# Patient Record
Sex: FEMALE | Race: WHITE | NOT HISPANIC OR LATINO | Employment: OTHER | URBAN - METROPOLITAN AREA
[De-identification: names, ages, dates, MRNs, and addresses within clinical notes are randomized per-mention and may not be internally consistent; named-entity substitution may affect disease eponyms.]

---

## 2018-10-02 ENCOUNTER — HOSPITAL ENCOUNTER (OUTPATIENT)
Dept: GENERAL RADIOLOGY | Facility: HOSPITAL | Age: 78
Discharge: HOME | End: 2018-10-02
Admitting: ORTHOPAEDIC SURGERY

## 2018-10-02 ENCOUNTER — HOSPITAL ENCOUNTER (OUTPATIENT)
Dept: GENERAL RADIOLOGY | Facility: HOSPITAL | Age: 78
Discharge: HOME | End: 2018-10-02

## 2018-10-02 ENCOUNTER — APPOINTMENT (OUTPATIENT)
Dept: PREADMISSION TESTING | Facility: HOSPITAL | Age: 78
End: 2018-10-02

## 2018-10-02 VITALS
HEART RATE: 51 BPM | DIASTOLIC BLOOD PRESSURE: 65 MMHG | RESPIRATION RATE: 16 BRPM | OXYGEN SATURATION: 99 % | SYSTOLIC BLOOD PRESSURE: 157 MMHG | WEIGHT: 173 LBS | TEMPERATURE: 97.4 F | HEIGHT: 65 IN | BODY MASS INDEX: 28.82 KG/M2

## 2018-10-02 DIAGNOSIS — M17.12 PRIMARY OSTEOARTHRITIS OF LEFT KNEE: ICD-10-CM

## 2018-10-02 LAB
ALBUMIN SERPL-MCNC: 4.4 G/DL (ref 3.5–5.2)
ALBUMIN/GLOB SERPL: 1.5 G/DL
ALP SERPL-CCNC: 69 U/L (ref 39–117)
ALT SERPL W P-5'-P-CCNC: 30 U/L (ref 1–33)
ANION GAP SERPL CALCULATED.3IONS-SCNC: 10 MMOL/L
APTT PPP: 26.1 SECONDS (ref 22.7–35.4)
AST SERPL-CCNC: 26 U/L (ref 1–32)
BACTERIA UR QL AUTO: ABNORMAL /HPF
BASOPHILS # BLD AUTO: 0.01 10*3/MM3 (ref 0–0.2)
BASOPHILS NFR BLD AUTO: 0.2 % (ref 0–1.5)
BILIRUB SERPL-MCNC: 0.3 MG/DL (ref 0.1–1.2)
BILIRUB UR QL STRIP: NEGATIVE
BUN BLD-MCNC: 23 MG/DL (ref 8–23)
BUN/CREAT SERPL: 27.1 (ref 7–25)
CALCIUM SPEC-SCNC: 10.3 MG/DL (ref 8.6–10.5)
CHLORIDE SERPL-SCNC: 104 MMOL/L (ref 98–107)
CLARITY UR: CLEAR
CO2 SERPL-SCNC: 26 MMOL/L (ref 22–29)
COLOR UR: ABNORMAL
CREAT BLD-MCNC: 0.85 MG/DL (ref 0.57–1)
DEPRECATED RDW RBC AUTO: 46.1 FL (ref 37–54)
EOSINOPHIL # BLD AUTO: 0.14 10*3/MM3 (ref 0–0.7)
EOSINOPHIL NFR BLD AUTO: 2.3 % (ref 0.3–6.2)
ERYTHROCYTE [DISTWIDTH] IN BLOOD BY AUTOMATED COUNT: 13 % (ref 11.7–13)
GFR SERPL CREATININE-BSD FRML MDRD: 65 ML/MIN/1.73
GLOBULIN UR ELPH-MCNC: 3 GM/DL
GLUCOSE BLD-MCNC: 95 MG/DL (ref 65–99)
GLUCOSE UR STRIP-MCNC: NEGATIVE MG/DL
HCT VFR BLD AUTO: 41.3 % (ref 35.6–45.5)
HGB BLD-MCNC: 13.4 G/DL (ref 11.9–15.5)
HGB UR QL STRIP.AUTO: NEGATIVE
HYALINE CASTS UR QL AUTO: ABNORMAL /LPF
IMM GRANULOCYTES # BLD: 0.01 10*3/MM3 (ref 0–0.03)
IMM GRANULOCYTES NFR BLD: 0.2 % (ref 0–0.5)
INR PPP: 1.03 (ref 0.9–1.1)
KETONES UR QL STRIP: NEGATIVE
LEUKOCYTE ESTERASE UR QL STRIP.AUTO: ABNORMAL
LYMPHOCYTES # BLD AUTO: 1.44 10*3/MM3 (ref 0.9–4.8)
LYMPHOCYTES NFR BLD AUTO: 23.7 % (ref 19.6–45.3)
MCH RBC QN AUTO: 31.7 PG (ref 26.9–32)
MCHC RBC AUTO-ENTMCNC: 32.4 G/DL (ref 32.4–36.3)
MCV RBC AUTO: 97.6 FL (ref 80.5–98.2)
MONOCYTES # BLD AUTO: 0.85 10*3/MM3 (ref 0.2–1.2)
MONOCYTES NFR BLD AUTO: 14 % (ref 5–12)
MUCOUS THREADS URNS QL MICRO: ABNORMAL /HPF
NEUTROPHILS # BLD AUTO: 3.64 10*3/MM3 (ref 1.9–8.1)
NEUTROPHILS NFR BLD AUTO: 59.8 % (ref 42.7–76)
NITRITE UR QL STRIP: NEGATIVE
PH UR STRIP.AUTO: <=5 [PH] (ref 5–8)
PLATELET # BLD AUTO: 184 10*3/MM3 (ref 140–500)
PMV BLD AUTO: 10 FL (ref 6–12)
POTASSIUM BLD-SCNC: 4.2 MMOL/L (ref 3.5–5.2)
PROT SERPL-MCNC: 7.4 G/DL (ref 6–8.5)
PROT UR QL STRIP: NEGATIVE
PROTHROMBIN TIME: 13.3 SECONDS (ref 11.7–14.2)
RBC # BLD AUTO: 4.23 10*6/MM3 (ref 3.9–5.2)
RBC # UR: ABNORMAL /HPF
REF LAB TEST METHOD: ABNORMAL
SODIUM BLD-SCNC: 140 MMOL/L (ref 136–145)
SP GR UR STRIP: 1.02 (ref 1–1.03)
SQUAMOUS #/AREA URNS HPF: ABNORMAL /HPF
UROBILINOGEN UR QL STRIP: ABNORMAL
WBC NRBC COR # BLD: 6.08 10*3/MM3 (ref 4.5–10.7)
WBC UR QL AUTO: ABNORMAL /HPF

## 2018-10-02 PROCEDURE — 36415 COLL VENOUS BLD VENIPUNCTURE: CPT

## 2018-10-02 PROCEDURE — 80053 COMPREHEN METABOLIC PANEL: CPT | Performed by: ORTHOPAEDIC SURGERY

## 2018-10-02 PROCEDURE — 71046 X-RAY EXAM CHEST 2 VIEWS: CPT

## 2018-10-02 PROCEDURE — 85025 COMPLETE CBC W/AUTO DIFF WBC: CPT | Performed by: ORTHOPAEDIC SURGERY

## 2018-10-02 PROCEDURE — 81001 URINALYSIS AUTO W/SCOPE: CPT | Performed by: ORTHOPAEDIC SURGERY

## 2018-10-02 PROCEDURE — 73560 X-RAY EXAM OF KNEE 1 OR 2: CPT

## 2018-10-02 PROCEDURE — 85610 PROTHROMBIN TIME: CPT | Performed by: ORTHOPAEDIC SURGERY

## 2018-10-02 PROCEDURE — 85730 THROMBOPLASTIN TIME PARTIAL: CPT | Performed by: ORTHOPAEDIC SURGERY

## 2018-10-02 PROCEDURE — 93010 ELECTROCARDIOGRAM REPORT: CPT | Performed by: INTERNAL MEDICINE

## 2018-10-02 PROCEDURE — 93005 ELECTROCARDIOGRAM TRACING: CPT

## 2018-10-02 RX ORDER — LISINOPRIL 10 MG/1
10 TABLET ORAL EVERY MORNING
COMMUNITY

## 2018-10-02 ASSESSMENT — KOOS JR
KOOS JR SCORE: 65.994
KOOS JR SCORE: 8

## 2018-10-02 NOTE — DISCHARGE INSTRUCTIONS
Take the following medications the morning of surgery with a small sip of water:  BRING LISINOPRIL  RANITIDINE TAKE THAT MORNING      General Instructions:  • Do not eat solid food after midnight the night before surgery.  • You may drink clear liquids day of surgery but must stop at least one hour before your hospital arrival time.  • It is beneficial for you to have a clear drink that contains carbohydrates the day of surgery.  We suggest a 12 to 20 ounce bottle of Gatorade or Powerade for non-diabetic patients or a 12 to 20 ounce bottle of G2 or Powerade Zero for diabetic patients. (Pediatric patients, are not advised to drink a 12 to 20 ounce carbohydrate drink)    Clear liquids are liquids you can see through.  Nothing red in color.     Plain water                               Sports drinks  Sodas                                   Gelatin (Jell-O)  Fruit juices without pulp such as white grape juice and apple juice  Popsicles that contain no fruit or yogurt  Tea or coffee (no cream or milk added)  Gatorade / Powerade  G2 / Powerade Zero    • Infants may have breast milk up to four hours before surgery.  • Infants drinking formula may drink formula up to six hours before surgery.   • Patients who avoid smoking, chewing tobacco and alcohol for 4 weeks prior to surgery have a reduced risk of post-operative complications.  Quit smoking as many days before surgery as you can.  • Do not smoke, use chewing tobacco or drink alcohol the day of surgery.   • If applicable bring your C-PAP/ BI-PAP machine.  • Bring any papers given to you in the doctor’s office.  • Wear clean comfortable clothes and socks.  • Do not wear contact lenses or make-up.  Bring a case for your glasses.   • Bring crutches or walker if applicable.  • Remove all piercings.  Leave jewelry and any other valuables at home.  • Hair extensions with metal clips must be removed prior to surgery.  • The Pre-Admission Testing nurse will instruct you to  bring medications if unable to obtain an accurate list in Pre-Admission Testing.        If you were given a blood bank ID arm band remember to bring it with you the day of surgery.    Preventing a Surgical Site Infection:  • For 2 to 3 days before surgery, avoid shaving with a razor because the razor can irritate skin and make it easier to develop an infection.    • Any areas of open skin can increase the risk of a post-operative wound infection by allowing bacteria to enter and travel throughout the body.  Notify your surgeon if you have any skin wounds / rashes even if it is not near the expected surgical site.  The area will need assessed to determine if surgery should be delayed until it is healed.  • The night prior to surgery sleep in a clean bed with clean clothing.  Do not allow pets to sleep with you.  • Shower on the morning of surgery using a fresh bar of anti-bacterial soap (such as Dial) and clean washcloth.  Dry with a clean towel and dress in clean clothing.  • Ask your surgeon if you will be receiving antibiotics prior to surgery.  • Make sure you, your family, and all healthcare providers clean their hands with soap and water or an alcohol based hand  before caring for you or your wound.    Day of surgery: 10/8/2018 ARRIVAL TIME 5:30 AM  Upon arrival, a Pre-op nurse and Anesthesiologist will review your health history, obtain vital signs, and answer questions you may have.  The only belongings needed at this time will be your home medications and if applicable your C-PAP/BI-PAP machine.  If you are staying overnight your family can leave the rest of your belongings in the car and bring them to your room later.  A Pre-op nurse will start an IV and you may receive medication in preparation for surgery, including something to help you relax.  Your family will be able to see you in the Pre-op area.  While you are in surgery your family should notify the waiting room  if they leave  the waiting room area and provide a contact phone number.    Please be aware that surgery does come with discomfort.  We want to make every effort to control your discomfort so please discuss any uncontrolled symptoms with your nurse.   Your doctor will most likely have prescribed pain medications.      If you are going home after surgery you will receive individualized written care instructions before being discharged.  A responsible adult must drive you to and from the hospital on the day of your surgery and stay with you for 24 hours.    If you are staying overnight following surgery, you will be transported to your hospital room following the recovery period.  [facility] has all private rooms.    You have received a list of surgical assistants for your reference.  If you have any questions please call Pre-Admission Testing at [phone].  Deductibles and co-payments are collected on the day of service. Please be prepared to pay the required co-pay, deductible or deposit on the day of service as defined by your plan.    2% CHLORAHEXIDINE GLUCONATE* CLOTH  Preparing or “prepping” skin before surgery can reduce the risk of infection at the surgical site. To make the process easier, [facility] has chosen disposable cloths moistened with a rinse-free, 2% Chlorhexidine Gluconate (CHG) antiseptic solution. The steps below outline the prepping process and should be carefully followed.        Use the prep cloth on the area that is circled in the diagram             Directions Night before Surgery  1) Shower using a fresh bar of anti-bacterial soap (such as Dial) and clean washcloth.  Use a clean towel to completely dry your skin.  2) Do not use any lotions, oils or creams on your skin.  3) Open the package and remove 1 cloth, wipe your skin for 30 seconds in a circular motion.  Allow to dry for 3 minutes.  4) Repeat #3 with second cloth.  5) Do not touch your eyes, ears, or mouth with the prep  cloth.  6) Allow the wet prep solution to air dry.  7) Discard the prep cloth and wash your hands with soap and water.   8) Dress in clean bed clothes and sleep on fresh clean bed sheets.   9) You may experience some temporary itching after the prep.    Directions Day of Surgery  1) Repeat steps 1,2,3,4,5,6,7, and 9.   2) Dress in clean clothes before coming to the hospital.    BACTROBAN NASAL OINTMENT  There are many germs normally in your nose. Bactroban is an ointment that will help reduce these germs. Please follow these instructions for Bactroban use:      ____The day before surgery in the morning  Date________    ____The day before surgery in the evening              Date________    ____The day of surgery in the morning    Date________    **Squirt ½ package of Bactroban Ointment onto a cotton applicator and apply to inside of 1st nostril.  Squirt the remaining Bactroban and apply to the inside of the other nostril.

## 2018-10-08 ENCOUNTER — ANESTHESIA EVENT (OUTPATIENT)
Dept: PERIOP | Facility: HOSPITAL | Age: 78
End: 2018-10-08

## 2018-10-08 ENCOUNTER — ANESTHESIA (OUTPATIENT)
Dept: PERIOP | Facility: HOSPITAL | Age: 78
End: 2018-10-08

## 2018-10-08 ENCOUNTER — HOSPITAL ENCOUNTER (INPATIENT)
Facility: HOSPITAL | Age: 78
LOS: 3 days | Discharge: HOME HEALTH | End: 2018-10-11
Attending: ORTHOPAEDIC SURGERY | Admitting: ORTHOPAEDIC SURGERY

## 2018-10-08 ENCOUNTER — APPOINTMENT (OUTPATIENT)
Dept: GENERAL RADIOLOGY | Facility: HOSPITAL | Age: 78
End: 2018-10-08

## 2018-10-08 DIAGNOSIS — M17.12 PRIMARY OSTEOARTHRITIS OF LEFT KNEE: Primary | ICD-10-CM

## 2018-10-08 PROBLEM — M17.9 DJD (DEGENERATIVE JOINT DISEASE) OF KNEE: Status: ACTIVE | Noted: 2018-10-08

## 2018-10-08 PROCEDURE — 25010000002 HYDROMORPHONE 1 MG/ML SOLUTION: Performed by: ORTHOPAEDIC SURGERY

## 2018-10-08 PROCEDURE — C1713 ANCHOR/SCREW BN/BN,TIS/BN: HCPCS | Performed by: ORTHOPAEDIC SURGERY

## 2018-10-08 PROCEDURE — C1776 JOINT DEVICE (IMPLANTABLE): HCPCS | Performed by: ORTHOPAEDIC SURGERY

## 2018-10-08 PROCEDURE — 25010000002 SUCCINYLCHOLINE PER 20 MG: Performed by: NURSE ANESTHETIST, CERTIFIED REGISTERED

## 2018-10-08 PROCEDURE — 25010000003 CEFAZOLIN IN DEXTROSE 2-4 GM/100ML-% SOLUTION: Performed by: NURSE ANESTHETIST, CERTIFIED REGISTERED

## 2018-10-08 PROCEDURE — 25010000002 ONDANSETRON PER 1 MG: Performed by: NURSE ANESTHETIST, CERTIFIED REGISTERED

## 2018-10-08 PROCEDURE — 25010000002 FENTANYL CITRATE (PF) 100 MCG/2ML SOLUTION: Performed by: NURSE ANESTHETIST, CERTIFIED REGISTERED

## 2018-10-08 PROCEDURE — 25010000002 PROMETHAZINE PER 50 MG: Performed by: ORTHOPAEDIC SURGERY

## 2018-10-08 PROCEDURE — 25010000002 KETOROLAC TROMETHAMINE PER 15 MG: Performed by: ORTHOPAEDIC SURGERY

## 2018-10-08 PROCEDURE — 25010000002 VANCOMYCIN 10 G RECONSTITUTED SOLUTION: Performed by: ORTHOPAEDIC SURGERY

## 2018-10-08 PROCEDURE — 97110 THERAPEUTIC EXERCISES: CPT | Performed by: PHYSICAL THERAPIST

## 2018-10-08 PROCEDURE — 25010000003 CEFAZOLIN IN DEXTROSE 2-4 GM/100ML-% SOLUTION: Performed by: ORTHOPAEDIC SURGERY

## 2018-10-08 PROCEDURE — 25010000002 PROPOFOL 10 MG/ML EMULSION: Performed by: NURSE ANESTHETIST, CERTIFIED REGISTERED

## 2018-10-08 PROCEDURE — 25010000002 MIDAZOLAM PER 1 MG: Performed by: ANESTHESIOLOGY

## 2018-10-08 PROCEDURE — 73560 X-RAY EXAM OF KNEE 1 OR 2: CPT

## 2018-10-08 PROCEDURE — 0SRD069 REPLACEMENT OF LEFT KNEE JOINT WITH OXIDIZED ZIRCONIUM ON POLYETHYLENE SYNTHETIC SUBSTITUTE, CEMENTED, OPEN APPROACH: ICD-10-PCS | Performed by: ORTHOPAEDIC SURGERY

## 2018-10-08 PROCEDURE — 25010000002 ONDANSETRON PER 1 MG: Performed by: ORTHOPAEDIC SURGERY

## 2018-10-08 PROCEDURE — 97162 PT EVAL MOD COMPLEX 30 MIN: CPT | Performed by: PHYSICAL THERAPIST

## 2018-10-08 DEVICE — JOURNEY TIBIAL BASEPLATE NONPOROUS                                    LEFT SIZE 5
Type: IMPLANTABLE DEVICE | Site: KNEE | Status: FUNCTIONAL
Brand: JOURNEY

## 2018-10-08 DEVICE — IMPLANTABLE DEVICE: Type: IMPLANTABLE DEVICE | Site: KNEE | Status: FUNCTIONAL

## 2018-10-08 DEVICE — JOURNEY 7.5 ROUND RESURF PAT 35MM STANDARD
Type: IMPLANTABLE DEVICE | Site: KNEE | Status: FUNCTIONAL
Brand: JOURNEY

## 2018-10-08 DEVICE — CMT BONE PALACOS R HI/VISC 1X40: Type: IMPLANTABLE DEVICE | Site: KNEE | Status: FUNCTIONAL

## 2018-10-08 DEVICE — JOURNEY II CR FEMORAL OXINIUM NONPOROUS LEFT SIZE 6
Type: IMPLANTABLE DEVICE | Site: KNEE | Status: FUNCTIONAL
Brand: JOURNEY

## 2018-10-08 DEVICE — JOURNEY II CR INSERT XLPE LEFT                                    SIZE 5-6 10MM
Type: IMPLANTABLE DEVICE | Site: KNEE | Status: FUNCTIONAL
Brand: JOURNEY

## 2018-10-08 RX ORDER — ROCURONIUM BROMIDE 10 MG/ML
INJECTION, SOLUTION INTRAVENOUS AS NEEDED
Status: DISCONTINUED | OUTPATIENT
Start: 2018-10-08 | End: 2018-10-08 | Stop reason: SURG

## 2018-10-08 RX ORDER — SODIUM CHLORIDE, SODIUM LACTATE, POTASSIUM CHLORIDE, CALCIUM CHLORIDE 600; 310; 30; 20 MG/100ML; MG/100ML; MG/100ML; MG/100ML
9 INJECTION, SOLUTION INTRAVENOUS CONTINUOUS
Status: DISCONTINUED | OUTPATIENT
Start: 2018-10-08 | End: 2018-10-11 | Stop reason: HOSPADM

## 2018-10-08 RX ORDER — SODIUM CHLORIDE 9 MG/ML
INJECTION, SOLUTION INTRAVENOUS AS NEEDED
Status: DISCONTINUED | OUTPATIENT
Start: 2018-10-08 | End: 2018-10-08 | Stop reason: HOSPADM

## 2018-10-08 RX ORDER — ASPIRIN 325 MG
325 TABLET, DELAYED RELEASE (ENTERIC COATED) ORAL DAILY
Status: DISCONTINUED | OUTPATIENT
Start: 2018-10-08 | End: 2018-10-11

## 2018-10-08 RX ORDER — PROMETHAZINE HYDROCHLORIDE 25 MG/1
12.5 TABLET ORAL ONCE AS NEEDED
Status: DISCONTINUED | OUTPATIENT
Start: 2018-10-08 | End: 2018-10-08 | Stop reason: HOSPADM

## 2018-10-08 RX ORDER — HYDROCODONE BITARTRATE AND ACETAMINOPHEN 7.5; 325 MG/1; MG/1
1 TABLET ORAL ONCE AS NEEDED
Status: DISCONTINUED | OUTPATIENT
Start: 2018-10-08 | End: 2018-10-08 | Stop reason: HOSPADM

## 2018-10-08 RX ORDER — LIDOCAINE HYDROCHLORIDE 40 MG/ML
SOLUTION TOPICAL AS NEEDED
Status: DISCONTINUED | OUTPATIENT
Start: 2018-10-08 | End: 2018-10-08 | Stop reason: SURG

## 2018-10-08 RX ORDER — OXYCODONE HYDROCHLORIDE AND ACETAMINOPHEN 5; 325 MG/1; MG/1
1 TABLET ORAL EVERY 4 HOURS PRN
Status: DISCONTINUED | OUTPATIENT
Start: 2018-10-08 | End: 2018-10-11 | Stop reason: HOSPADM

## 2018-10-08 RX ORDER — ONDANSETRON 2 MG/ML
INJECTION INTRAMUSCULAR; INTRAVENOUS AS NEEDED
Status: DISCONTINUED | OUTPATIENT
Start: 2018-10-08 | End: 2018-10-08 | Stop reason: SURG

## 2018-10-08 RX ORDER — PROMETHAZINE HYDROCHLORIDE 25 MG/ML
5 INJECTION, SOLUTION INTRAMUSCULAR; INTRAVENOUS
Status: DISCONTINUED | OUTPATIENT
Start: 2018-10-08 | End: 2018-10-08 | Stop reason: HOSPADM

## 2018-10-08 RX ORDER — SODIUM CHLORIDE, SODIUM LACTATE, POTASSIUM CHLORIDE, CALCIUM CHLORIDE 600; 310; 30; 20 MG/100ML; MG/100ML; MG/100ML; MG/100ML
100 INJECTION, SOLUTION INTRAVENOUS CONTINUOUS
Status: DISCONTINUED | OUTPATIENT
Start: 2018-10-08 | End: 2018-10-11 | Stop reason: HOSPADM

## 2018-10-08 RX ORDER — ONDANSETRON 2 MG/ML
4 INJECTION INTRAMUSCULAR; INTRAVENOUS ONCE AS NEEDED
Status: DISCONTINUED | OUTPATIENT
Start: 2018-10-08 | End: 2018-10-08 | Stop reason: HOSPADM

## 2018-10-08 RX ORDER — ALBUTEROL SULFATE 2.5 MG/3ML
2.5 SOLUTION RESPIRATORY (INHALATION) ONCE AS NEEDED
Status: DISCONTINUED | OUTPATIENT
Start: 2018-10-08 | End: 2018-10-08 | Stop reason: HOSPADM

## 2018-10-08 RX ORDER — NALOXONE HCL 0.4 MG/ML
0.1 VIAL (ML) INJECTION
Status: DISCONTINUED | OUTPATIENT
Start: 2018-10-08 | End: 2018-10-11 | Stop reason: HOSPADM

## 2018-10-08 RX ORDER — KETOROLAC TROMETHAMINE 15 MG/ML
15 INJECTION, SOLUTION INTRAMUSCULAR; INTRAVENOUS EVERY 6 HOURS
Status: COMPLETED | OUTPATIENT
Start: 2018-10-08 | End: 2018-10-09

## 2018-10-08 RX ORDER — ACETAMINOPHEN 325 MG/1
325 TABLET ORAL EVERY 4 HOURS PRN
Status: DISCONTINUED | OUTPATIENT
Start: 2018-10-08 | End: 2018-10-11 | Stop reason: HOSPADM

## 2018-10-08 RX ORDER — NALOXONE HCL 0.4 MG/ML
0.2 VIAL (ML) INJECTION AS NEEDED
Status: DISCONTINUED | OUTPATIENT
Start: 2018-10-08 | End: 2018-10-08 | Stop reason: HOSPADM

## 2018-10-08 RX ORDER — PROMETHAZINE HYDROCHLORIDE 25 MG/ML
12.5 INJECTION, SOLUTION INTRAMUSCULAR; INTRAVENOUS EVERY 8 HOURS PRN
Status: DISCONTINUED | OUTPATIENT
Start: 2018-10-08 | End: 2018-10-11 | Stop reason: HOSPADM

## 2018-10-08 RX ORDER — ACETAMINOPHEN 325 MG/1
650 TABLET ORAL EVERY 4 HOURS PRN
Status: DISCONTINUED | OUTPATIENT
Start: 2018-10-08 | End: 2018-10-11 | Stop reason: HOSPADM

## 2018-10-08 RX ORDER — LISINOPRIL 10 MG/1
10 TABLET ORAL EVERY MORNING
Status: DISCONTINUED | OUTPATIENT
Start: 2018-10-08 | End: 2018-10-11 | Stop reason: HOSPADM

## 2018-10-08 RX ORDER — ONDANSETRON 4 MG/1
4 TABLET, FILM COATED ORAL EVERY 6 HOURS PRN
Status: DISCONTINUED | OUTPATIENT
Start: 2018-10-08 | End: 2018-10-11 | Stop reason: HOSPADM

## 2018-10-08 RX ORDER — ATENOLOL 50 MG/1
50 TABLET ORAL EVERY EVENING
Status: DISCONTINUED | OUTPATIENT
Start: 2018-10-08 | End: 2018-10-11 | Stop reason: HOSPADM

## 2018-10-08 RX ORDER — FERROUS SULFATE 325(65) MG
325 TABLET ORAL
Status: DISCONTINUED | OUTPATIENT
Start: 2018-10-08 | End: 2018-10-11 | Stop reason: HOSPADM

## 2018-10-08 RX ORDER — ONDANSETRON 4 MG/1
4 TABLET, ORALLY DISINTEGRATING ORAL EVERY 6 HOURS PRN
Status: DISCONTINUED | OUTPATIENT
Start: 2018-10-08 | End: 2018-10-11 | Stop reason: HOSPADM

## 2018-10-08 RX ORDER — MAGNESIUM HYDROXIDE 1200 MG/15ML
LIQUID ORAL AS NEEDED
Status: DISCONTINUED | OUTPATIENT
Start: 2018-10-08 | End: 2018-10-08 | Stop reason: HOSPADM

## 2018-10-08 RX ORDER — LEVOTHYROXINE SODIUM 0.05 MG/1
50 TABLET ORAL DAILY
Status: DISCONTINUED | OUTPATIENT
Start: 2018-10-08 | End: 2018-10-11 | Stop reason: HOSPADM

## 2018-10-08 RX ORDER — ASPIRIN 81 MG/1
81 TABLET, CHEWABLE ORAL DAILY
COMMUNITY
End: 2022-11-15 | Stop reason: HOSPADM

## 2018-10-08 RX ORDER — EPHEDRINE SULFATE 50 MG/ML
5 INJECTION, SOLUTION INTRAVENOUS ONCE AS NEEDED
Status: DISCONTINUED | OUTPATIENT
Start: 2018-10-08 | End: 2018-10-08 | Stop reason: HOSPADM

## 2018-10-08 RX ORDER — FENTANYL CITRATE 50 UG/ML
50 INJECTION, SOLUTION INTRAMUSCULAR; INTRAVENOUS
Status: DISCONTINUED | OUTPATIENT
Start: 2018-10-08 | End: 2018-10-08 | Stop reason: HOSPADM

## 2018-10-08 RX ORDER — SENNA AND DOCUSATE SODIUM 50; 8.6 MG/1; MG/1
2 TABLET, FILM COATED ORAL ONCE
Status: DISCONTINUED | OUTPATIENT
Start: 2018-10-08 | End: 2018-10-11 | Stop reason: HOSPADM

## 2018-10-08 RX ORDER — PROMETHAZINE HYDROCHLORIDE 25 MG/1
25 TABLET ORAL ONCE AS NEEDED
Status: DISCONTINUED | OUTPATIENT
Start: 2018-10-08 | End: 2018-10-08 | Stop reason: HOSPADM

## 2018-10-08 RX ORDER — MIDAZOLAM HYDROCHLORIDE 1 MG/ML
1 INJECTION INTRAMUSCULAR; INTRAVENOUS
Status: DISCONTINUED | OUTPATIENT
Start: 2018-10-08 | End: 2018-10-08 | Stop reason: HOSPADM

## 2018-10-08 RX ORDER — HYDROMORPHONE HYDROCHLORIDE 1 MG/ML
0.5 INJECTION, SOLUTION INTRAMUSCULAR; INTRAVENOUS; SUBCUTANEOUS
Status: DISCONTINUED | OUTPATIENT
Start: 2018-10-08 | End: 2018-10-08 | Stop reason: HOSPADM

## 2018-10-08 RX ORDER — PROMETHAZINE HYDROCHLORIDE 25 MG/ML
25 INJECTION, SOLUTION INTRAMUSCULAR; INTRAVENOUS EVERY 8 HOURS PRN
Status: DISCONTINUED | OUTPATIENT
Start: 2018-10-08 | End: 2018-10-11 | Stop reason: HOSPADM

## 2018-10-08 RX ORDER — BISACODYL 5 MG/1
10 TABLET, DELAYED RELEASE ORAL DAILY PRN
Status: DISCONTINUED | OUTPATIENT
Start: 2018-10-08 | End: 2018-10-11 | Stop reason: HOSPADM

## 2018-10-08 RX ORDER — LIDOCAINE HYDROCHLORIDE 10 MG/ML
0.5 INJECTION, SOLUTION EPIDURAL; INFILTRATION; INTRACAUDAL; PERINEURAL ONCE AS NEEDED
Status: DISCONTINUED | OUTPATIENT
Start: 2018-10-08 | End: 2018-10-08 | Stop reason: HOSPADM

## 2018-10-08 RX ORDER — PROPOFOL 10 MG/ML
VIAL (ML) INTRAVENOUS AS NEEDED
Status: DISCONTINUED | OUTPATIENT
Start: 2018-10-08 | End: 2018-10-08 | Stop reason: SURG

## 2018-10-08 RX ORDER — SUCCINYLCHOLINE CHLORIDE 20 MG/ML
INJECTION INTRAMUSCULAR; INTRAVENOUS AS NEEDED
Status: DISCONTINUED | OUTPATIENT
Start: 2018-10-08 | End: 2018-10-08 | Stop reason: SURG

## 2018-10-08 RX ORDER — ACETAMINOPHEN 160 MG/5ML
650 SOLUTION ORAL EVERY 4 HOURS PRN
Status: DISCONTINUED | OUTPATIENT
Start: 2018-10-08 | End: 2018-10-11 | Stop reason: HOSPADM

## 2018-10-08 RX ORDER — OXYCODONE AND ACETAMINOPHEN 7.5; 325 MG/1; MG/1
1 TABLET ORAL ONCE AS NEEDED
Status: DISCONTINUED | OUTPATIENT
Start: 2018-10-08 | End: 2018-10-08 | Stop reason: HOSPADM

## 2018-10-08 RX ORDER — PROMETHAZINE HYDROCHLORIDE 25 MG/1
25 SUPPOSITORY RECTAL ONCE AS NEEDED
Status: DISCONTINUED | OUTPATIENT
Start: 2018-10-08 | End: 2018-10-08 | Stop reason: HOSPADM

## 2018-10-08 RX ORDER — BISACODYL 10 MG
10 SUPPOSITORY, RECTAL RECTAL DAILY PRN
Status: DISCONTINUED | OUTPATIENT
Start: 2018-10-08 | End: 2018-10-11 | Stop reason: HOSPADM

## 2018-10-08 RX ORDER — SODIUM CHLORIDE 0.9 % (FLUSH) 0.9 %
3 SYRINGE (ML) INJECTION EVERY 12 HOURS SCHEDULED
Status: DISCONTINUED | OUTPATIENT
Start: 2018-10-08 | End: 2018-10-11 | Stop reason: HOSPADM

## 2018-10-08 RX ORDER — ATORVASTATIN CALCIUM 20 MG/1
20 TABLET, FILM COATED ORAL DAILY
Status: DISCONTINUED | OUTPATIENT
Start: 2018-10-08 | End: 2018-10-11 | Stop reason: HOSPADM

## 2018-10-08 RX ORDER — LABETALOL HYDROCHLORIDE 5 MG/ML
5 INJECTION, SOLUTION INTRAVENOUS
Status: DISCONTINUED | OUTPATIENT
Start: 2018-10-08 | End: 2018-10-08 | Stop reason: HOSPADM

## 2018-10-08 RX ORDER — GLYCOPYRROLATE 0.2 MG/ML
INJECTION INTRAMUSCULAR; INTRAVENOUS AS NEEDED
Status: DISCONTINUED | OUTPATIENT
Start: 2018-10-08 | End: 2018-10-08 | Stop reason: SURG

## 2018-10-08 RX ORDER — SODIUM CHLORIDE 0.9 % (FLUSH) 0.9 %
1-10 SYRINGE (ML) INJECTION AS NEEDED
Status: DISCONTINUED | OUTPATIENT
Start: 2018-10-08 | End: 2018-10-11 | Stop reason: HOSPADM

## 2018-10-08 RX ORDER — FLUMAZENIL 0.1 MG/ML
0.2 INJECTION INTRAVENOUS AS NEEDED
Status: DISCONTINUED | OUTPATIENT
Start: 2018-10-08 | End: 2018-10-08 | Stop reason: HOSPADM

## 2018-10-08 RX ORDER — TRANEXAMIC ACID 100 MG/ML
INJECTION, SOLUTION INTRAVENOUS AS NEEDED
Status: DISCONTINUED | OUTPATIENT
Start: 2018-10-08 | End: 2018-10-08 | Stop reason: SURG

## 2018-10-08 RX ORDER — ACETAMINOPHEN 650 MG/1
650 SUPPOSITORY RECTAL EVERY 4 HOURS PRN
Status: DISCONTINUED | OUTPATIENT
Start: 2018-10-08 | End: 2018-10-11 | Stop reason: HOSPADM

## 2018-10-08 RX ORDER — OXYCODONE HYDROCHLORIDE AND ACETAMINOPHEN 5; 325 MG/1; MG/1
2 TABLET ORAL EVERY 4 HOURS PRN
Status: DISCONTINUED | OUTPATIENT
Start: 2018-10-08 | End: 2018-10-11 | Stop reason: HOSPADM

## 2018-10-08 RX ORDER — FENTANYL CITRATE 50 UG/ML
INJECTION, SOLUTION INTRAMUSCULAR; INTRAVENOUS AS NEEDED
Status: DISCONTINUED | OUTPATIENT
Start: 2018-10-08 | End: 2018-10-08 | Stop reason: SURG

## 2018-10-08 RX ORDER — SODIUM CHLORIDE 0.9 % (FLUSH) 0.9 %
1-10 SYRINGE (ML) INJECTION AS NEEDED
Status: DISCONTINUED | OUTPATIENT
Start: 2018-10-08 | End: 2018-10-08 | Stop reason: HOSPADM

## 2018-10-08 RX ORDER — CEFAZOLIN SODIUM 2 G/100ML
INJECTION, SOLUTION INTRAVENOUS AS NEEDED
Status: DISCONTINUED | OUTPATIENT
Start: 2018-10-08 | End: 2018-10-08 | Stop reason: SURG

## 2018-10-08 RX ORDER — ACETAMINOPHEN 500 MG
1000 TABLET ORAL ONCE
Status: COMPLETED | OUTPATIENT
Start: 2018-10-08 | End: 2018-10-08

## 2018-10-08 RX ORDER — MIDAZOLAM HYDROCHLORIDE 1 MG/ML
2 INJECTION INTRAMUSCULAR; INTRAVENOUS
Status: DISCONTINUED | OUTPATIENT
Start: 2018-10-08 | End: 2018-10-08 | Stop reason: HOSPADM

## 2018-10-08 RX ORDER — PROMETHAZINE HYDROCHLORIDE 25 MG/ML
12.5 INJECTION, SOLUTION INTRAMUSCULAR; INTRAVENOUS ONCE AS NEEDED
Status: DISCONTINUED | OUTPATIENT
Start: 2018-10-08 | End: 2018-10-08 | Stop reason: HOSPADM

## 2018-10-08 RX ORDER — FAMOTIDINE 10 MG/ML
20 INJECTION, SOLUTION INTRAVENOUS ONCE
Status: COMPLETED | OUTPATIENT
Start: 2018-10-08 | End: 2018-10-08

## 2018-10-08 RX ORDER — ONDANSETRON 2 MG/ML
4 INJECTION INTRAMUSCULAR; INTRAVENOUS EVERY 6 HOURS PRN
Status: DISCONTINUED | OUTPATIENT
Start: 2018-10-08 | End: 2018-10-11 | Stop reason: HOSPADM

## 2018-10-08 RX ORDER — CEFAZOLIN SODIUM 2 G/100ML
2 INJECTION, SOLUTION INTRAVENOUS EVERY 8 HOURS
Status: COMPLETED | OUTPATIENT
Start: 2018-10-08 | End: 2018-10-09

## 2018-10-08 RX ADMIN — MIDAZOLAM HYDROCHLORIDE 2 MG: 2 INJECTION, SOLUTION INTRAMUSCULAR; INTRAVENOUS at 06:36

## 2018-10-08 RX ADMIN — PROPOFOL 120 MG: 10 INJECTION, EMULSION INTRAVENOUS at 07:07

## 2018-10-08 RX ADMIN — ONDANSETRON 4 MG: 2 INJECTION INTRAMUSCULAR; INTRAVENOUS at 12:08

## 2018-10-08 RX ADMIN — FENTANYL CITRATE 25 MCG: 50 INJECTION, SOLUTION INTRAMUSCULAR; INTRAVENOUS at 08:39

## 2018-10-08 RX ADMIN — GLYCOPYRROLATE 0.2 MG: 0.2 INJECTION INTRAMUSCULAR; INTRAVENOUS at 08:08

## 2018-10-08 RX ADMIN — FENTANYL CITRATE 25 MCG: 50 INJECTION, SOLUTION INTRAMUSCULAR; INTRAVENOUS at 08:25

## 2018-10-08 RX ADMIN — KETOROLAC TROMETHAMINE 15 MG: 15 INJECTION, SOLUTION INTRAMUSCULAR; INTRAVENOUS at 12:48

## 2018-10-08 RX ADMIN — PROMETHAZINE HYDROCHLORIDE 25 MG: 25 INJECTION INTRAMUSCULAR; INTRAVENOUS at 21:39

## 2018-10-08 RX ADMIN — FENTANYL CITRATE 25 MCG: 50 INJECTION, SOLUTION INTRAMUSCULAR; INTRAVENOUS at 07:50

## 2018-10-08 RX ADMIN — ACETAMINOPHEN 1000 MG: 500 TABLET, FILM COATED ORAL at 05:44

## 2018-10-08 RX ADMIN — KETOROLAC TROMETHAMINE 15 MG: 15 INJECTION, SOLUTION INTRAMUSCULAR; INTRAVENOUS at 18:48

## 2018-10-08 RX ADMIN — FAMOTIDINE 20 MG: 10 INJECTION, SOLUTION INTRAVENOUS at 06:36

## 2018-10-08 RX ADMIN — ONDANSETRON 4 MG: 2 INJECTION INTRAMUSCULAR; INTRAVENOUS at 18:48

## 2018-10-08 RX ADMIN — FENTANYL CITRATE 50 MCG: 50 INJECTION, SOLUTION INTRAMUSCULAR; INTRAVENOUS at 07:32

## 2018-10-08 RX ADMIN — FENTANYL CITRATE 25 MCG: 50 INJECTION, SOLUTION INTRAMUSCULAR; INTRAVENOUS at 08:31

## 2018-10-08 RX ADMIN — CEFAZOLIN SODIUM 2 G: 2 INJECTION, SOLUTION INTRAVENOUS at 08:14

## 2018-10-08 RX ADMIN — SODIUM CHLORIDE, POTASSIUM CHLORIDE, SODIUM LACTATE AND CALCIUM CHLORIDE 100 ML/HR: 600; 310; 30; 20 INJECTION, SOLUTION INTRAVENOUS at 09:08

## 2018-10-08 RX ADMIN — FENTANYL CITRATE 25 MCG: 50 INJECTION, SOLUTION INTRAMUSCULAR; INTRAVENOUS at 08:05

## 2018-10-08 RX ADMIN — HYDROMORPHONE HYDROCHLORIDE 1 MG: 1 INJECTION, SOLUTION INTRAMUSCULAR; INTRAVENOUS; SUBCUTANEOUS at 11:25

## 2018-10-08 RX ADMIN — ACETAMINOPHEN 650 MG: 325 TABLET, FILM COATED ORAL at 10:01

## 2018-10-08 RX ADMIN — Medication 3 ML: at 09:45

## 2018-10-08 RX ADMIN — LIDOCAINE HYDROCHLORIDE 1 EACH: 40 SOLUTION TOPICAL at 07:08

## 2018-10-08 RX ADMIN — ROCURONIUM BROMIDE 10 MG: 10 INJECTION INTRAVENOUS at 07:06

## 2018-10-08 RX ADMIN — SODIUM CHLORIDE, POTASSIUM CHLORIDE, SODIUM LACTATE AND CALCIUM CHLORIDE 9 ML/HR: 600; 310; 30; 20 INJECTION, SOLUTION INTRAVENOUS at 06:36

## 2018-10-08 RX ADMIN — FENTANYL CITRATE 50 MCG: 50 INJECTION, SOLUTION INTRAMUSCULAR; INTRAVENOUS at 07:06

## 2018-10-08 RX ADMIN — ATENOLOL 50 MG: 50 TABLET ORAL at 21:44

## 2018-10-08 RX ADMIN — VANCOMYCIN HYDROCHLORIDE 1500 MG: 10 INJECTION, POWDER, LYOPHILIZED, FOR SOLUTION INTRAVENOUS at 05:44

## 2018-10-08 RX ADMIN — ACETAMINOPHEN 650 MG: 325 TABLET, FILM COATED ORAL at 16:06

## 2018-10-08 RX ADMIN — TRANEXAMIC ACID 1000 MG: 100 INJECTION, SOLUTION INTRAVENOUS at 08:05

## 2018-10-08 RX ADMIN — FENTANYL CITRATE 25 MCG: 50 INJECTION, SOLUTION INTRAMUSCULAR; INTRAVENOUS at 08:34

## 2018-10-08 RX ADMIN — SUCCINYLCHOLINE CHLORIDE 100 MG: 20 INJECTION, SOLUTION INTRAMUSCULAR; INTRAVENOUS; PARENTERAL at 07:07

## 2018-10-08 RX ADMIN — ONDANSETRON 4 MG: 2 INJECTION INTRAMUSCULAR; INTRAVENOUS at 08:25

## 2018-10-08 RX ADMIN — CEFAZOLIN SODIUM 2 G: 2 INJECTION, SOLUTION INTRAVENOUS at 16:00

## 2018-10-08 NOTE — PLAN OF CARE
Problem: Patient Care Overview  Goal: Plan of Care Review   10/08/18 1323   OTHER   Outcome Summary Pt is s/p L TKa and presents with pain, limited ROM and difficulty walking. Pt would benefit from PT to address these impairments

## 2018-10-08 NOTE — OP NOTE
Guido Ramos M.D. - [facility]    Patient Name:  Vidhya Ramirez  YOB: 1940  Medical Records Number:  [id number]    Date of Procedure:  10/8/2018    Pre-operative Diagnosis:  DJD Left Knee    Post-operative Diagnosis:  DJD Left Knee    Procedure Performed:  Left Total Knee Replacement     Anesthesia: General, supplemented by a periarticular Exparel/bupivacaine injection.      Surgeon: Guido Ramos MD     Assistant: Liberty Richardson PA-C; note that as part of the surgical procedure, I utilized service of an assistant surgeon, specifically Liberty Richardson PA-C. Assistant surgeon participated in crucial portion of the operation. Use of the assistant surgeon greatly reduced overall operative time, thus significantly reducing overall morbidity for the patient.      Implants:      Implant Name Type Inv. Item Serial No.  Lot No. LRB No. Used Action   CMT BONE PALACOS R HI/VISC 1X40 - [id number] Implant CMT BONE PALACOS R HI/VISC 1X40  [facility] [id number] Left 1 Implanted   CMT BONE PALACOS R HI/VISC 1X40 - [id number] Implant CMT BONE PALACOS R HI/VISC 1X40  [employer] [id number] Left 1 Implanted   COMP FEM JOURNEY2 OXINIUM CR NONPOR SZ6 LT - [id number] Implant COMP FEM JOURNEY2 OXINIUM CR NONPOR SZ6 LT  [employer] [id number] Left 1 Implanted   BASEPLT TIB JOURNEY NONPOR SZ5 LT - [id number] Implant BASEPLT TIB JOURNEY NONPOR SZ5 LT  [employer] [id number] Left 1 Implanted   PATELLA RESRF JOURNEY 7.5X35MM RND - [id number] Implant PATELLA RESRF JOURNEY 7.5X35MM RND  [employer] [id number] Left 1 Implanted   INSRT ART JOURNEY2 CR SZ5TO6 10MM LT - [id number] Implant INSRT ART JOURNEY2 CR SZ5TO6 10MM LT   [employer] [id number] Left 1 Implanted       Implants utilized: I used the SN Journey 2 system.  I used a size 5 tibial baseplate.  Size 6  femur.  10 CR mm  polyethylene insert.  35 patella.    Tourniquet Time: Was 53 minutes.       Medications: Note that we gave 1 g IV tranexamic acid when the cement was mixed.      Estimated Blood Loss:   150 mL    Specimens: * No orders in the log *     Complications: None.      Quality Measures: I have documented the patient's current medications including dosage, frequency, and route of administration in medical record today. Conservative alternatives were discussed with the patient as part of the decision-making process prior to the procedure. The patient was evaluated immediately preoperatively for cardiovascular and thromboembolic risk factors. There were no acute risk factors. Prophylactic IV antibiotics were administered before the tourniquet went up.      Description of Procedure: After prep and drape, Left knee was approached via midline longitudinal anterior incision. Medial parapatellar arthrotomy was extended to the vastus medialis obliquus which was split in line with the fibers near the medial border, in keeping with minimally invasive technique. Patella was osteotomized proper depth for the patella implant. [place] holes are created. Patella was subluxed and protected. Intramedullary instrumentation was used on each side of the joint; careful and thorough canal content irrigation. I cut the femur 10 mm depth, 5° valgus controlling rotation. The femur  was sized appropriatelyt. Anterior, posterior, and chamfer cuts were made. Tibia is cut 10 mm depth, 5° of posterior slope. Meniscectomies are completed. Trial reduction is performed. Rotation is marked and keel slot was created.      Bony surface was thoroughly cleaned and dried. I injected the posterior capsule and medial lateral gutter with Exparel solution containing 20 mL Exparel, 25 mL 0.25% bupivacaine with epinephrine, 20 mL saline. Care was taken to protect popliteal neurovascular structures and the peroneal nerve. I cemented the tibial baseplate,  Femur, and patella.  Trial reduction revealed a 10 mm CR polyethylene insert best  balanced stability and range of motion, and is locked in the tibial tray.      Tourniquet was released; hemostasis obtained. Wound was closed in layers with #1 Vicryl on the capsule, 2-0 Vicryl in subcutaneous tissue, and zipline in the skin over a 1/8-inch drain. Note that I injected my capsule with my Exparel solution during closure.      Guido Ramos MD  10/8/2018  9:44 AM

## 2018-10-08 NOTE — ANESTHESIA PREPROCEDURE EVALUATION
Anesthesia Evaluation     Patient summary reviewed and Nursing notes reviewed   NPO Solid Status: > 8 hours  NPO Liquid Status: > 8 hours           Airway   Mallampati: II  TM distance: >3 FB  Neck ROM: full  no difficulty expected  Dental - normal exam     Pulmonary - negative pulmonary ROS and normal exam   Cardiovascular - normal exam  Exercise tolerance: good (4-7 METS)    ECG reviewed  Rhythm: regular  Rate: normal    (+) hypertension, hyperlipidemia,       Neuro/Psych- negative ROS  GI/Hepatic/Renal/Endo    (+)  GERD,      Musculoskeletal     Abdominal  - normal exam   Substance History - negative use     OB/GYN          Other   (+) arthritis                     Anesthesia Plan    ASA 2     general     intravenous induction   Anesthetic plan, all risks, benefits, and alternatives have been provided, discussed and informed consent has been obtained with: patient.

## 2018-10-08 NOTE — ANESTHESIA PROCEDURE NOTES
Airway  Urgency: elective    Airway not difficult    General Information and Staff    Patient location during procedure: OR  Anesthesiologist: CALOS ABARCA  CRNA: AMARJIT HANSEN    Indications and Patient Condition  Indications for airway management: airway protection    Preoxygenated: yes  MILS maintained throughout  Mask difficulty assessment: 0 - not attempted    Final Airway Details  Final airway type: endotracheal airway      Successful airway: ETT  Cuffed: yes   Successful intubation technique: direct laryngoscopy  Facilitating devices/methods: intubating stylet  Endotracheal tube insertion site: oral  Blade: Kyung  Blade size: 3  ETT size: 7.0 mm  Cormack-Lehane Classification: grade I - full view of glottis  Placement verified by: chest auscultation and capnometry   Cuff volume (mL): 8  Measured from: lips  ETT to lips (cm): 21  Number of attempts at approach: 1    Additional Comments  Atraumatic ET Tube placement.  Teeth as pre-op. BLEBS.  -ABD sounds.  +ET CO2.  Secured to face

## 2018-10-08 NOTE — ANESTHESIA POSTPROCEDURE EVALUATION
Patient: Vidhya Ramirez    Procedure Summary     Date:  10/08/18 Room / Location:  [facility] OR [address] [place] [address] MAIN OR    Anesthesia Start:  0700 Anesthesia Stop:  0841    Procedure:  lt TOTAL KNEE ARTHROPLASTY (Left Knee) Diagnosis:      Surgeon:  Guido Ramos MD Provider:  Jose Martin Grier MD    Anesthesia Type:  general ASA Status:  2          Anesthesia Type: general  Last vitals  BP   146/72 (10/08/18 0910)   Temp   36.6 °C (97.9 °F) (10/08/18 0836)   Pulse   70 (10/08/18 0910)   Resp   14 (10/08/18 0910)     SpO2   100 % (10/08/18 0910)     Post Anesthesia Care and Evaluation    Patient location during evaluation: PACU  Patient participation: complete - patient participated  Level of consciousness: awake and alert  Pain management: adequate  Airway patency: patent  Anesthetic complications: No anesthetic complications    Cardiovascular status: acceptable  Respiratory status: acceptable  Hydration status: acceptable

## 2018-10-08 NOTE — THERAPY EVALUATION
Acute Care - Physical Therapy Initial Evaluation  [facility]     Patient Name: Vidhya Ramirez  : 1940  MRN: [id number]  Today's Date: 10/8/2018                Admit Date: 10/8/2018    Visit Dx: No diagnosis found.  Patient Active Problem List   Diagnosis   • DJD (degenerative joint disease) of knee     Past Medical History:   Diagnosis Date   • Arthritis    • Disease of thyroid gland    • GERD (gastroesophageal reflux disease)    • Hyperlipidemia    • Hypertension    • Left knee pain      Past Surgical History:   Procedure Laterality Date   •  SECTION     • COLONOSCOPY     • KNEE ARTHROPLASTY Right         PT ASSESSMENT (last 12 hours)      Physical Therapy Evaluation     Row Name 10/08/18 1131          PT Evaluation Time/Intention    Subjective Information complains of  -     Document Type evaluation  -     Mode of Treatment physical therapy  -     Patient Effort good  -     Row Name 10/08/18 1131          General Information    Patient Observations alert;cooperative;agree to therapy  -     General Observations of Patient in bed  -     Prior Level of Function independent:  -     Equipment Currently Used at Home none  -     Pertinent History of Current Functional Problem s/p L TKA  -     Existing Precautions/Restrictions fall  -     Row Name 10/08/18 1131          Relationship/Environment    Lives With alone  -     Row Name 10/08/18 1131          Resource/Environmental Concerns    Current Living Arrangements home/apartment/condo  -     Row Name 10/08/18 1131          Cognitive Assessment/Interventions    Additional Documentation Cognitive Assessment/Intervention (Group)  -     Row Name 10/08/18 1131          Cognitive Assessment/Intervention- PT/OT    Orientation Status (Cognition) oriented x 4  -KH     Follows Commands (Cognition) WNL  -     Personal Safety Interventions fall prevention program maintained;gait belt;nonskid shoes/slippers when out of bed  -     Row  Name 10/08/18 1131          Mobility Assessment/Treatment    Extremity Weight-bearing Status left lower extremity  -     Left Lower Extremity (Weight-bearing Status) weight-bearing as tolerated (WBAT)  -     Row Name 10/08/18 1131          Bed Mobility Assessment/Treatment    Bed Mobility Assessment/Treatment supine-sit;sit-supine  -     Supine-Sit Nicholas (Bed Mobility) minimum assist (75% patient effort)  -     Assistive Device (Bed Mobility) bed rails;draw sheet;head of bed elevated  -     Row Name 10/08/18 1131          Transfer Assessment/Treatment    Transfer Assessment/Treatment sit-stand transfer;stand-sit transfer  -     Sit-Stand Nicholas (Transfers) minimum assist (75% patient effort)  -     Stand-Sit Nicholas (Transfers) minimum assist (75% patient effort)  -     Row Name 10/08/18 1131          Sit-Stand Transfer    Assistive Device (Sit-Stand Transfers) walker, front-wheeled  -     Row Name 10/08/18 1131          Stand-Sit Transfer    Assistive Device (Stand-Sit Transfers) walker, front-wheeled  -     Row Name 10/08/18 1131          Gait/Stairs Assessment/Training    Nicholas Level (Gait) minimum assist (75% patient effort)  -     Assistive Device (Gait) walker, front-wheeled  -     Distance in Feet (Gait) 5  -KH     Pattern (Gait) step-to  -     Deviations/Abnormal Patterns (Gait) antalgic;gait speed decreased;stride length decreased  -     Bilateral Gait Deviations weight shift ability decreased  -     Comment (Gait/Stairs) limited by dizziness  -     Row Name 10/08/18 1131          General ROM    GENERAL ROM COMMENTS WFL except L knee  -     Row Name 10/08/18 1131          MMT (Manual Muscle Testing)    General MMT Comments WFL  -     Row Name 10/08/18 1131          Motor Assessment/Intervention    Additional Documentation Therapeutic Exercise Interventions (Group)  -     Row Name 10/08/18 1131          Therapeutic Exercise    Comment  (Therapeutic Exercise) L TKA protocol x 10 reps  -     Row Name 10/08/18 1131          Pain Assessment    Additional Documentation Pain Scale: Numbers Pre/Post-Treatment (Group)  -     Row Name 10/08/18 1131          Pain Scale: Numbers Pre/Post-Treatment    Pain Scale: Numbers, Pretreatment 5/10  -     Pain Scale: Numbers, Post-Treatment 6/10  -     Pain Location - Side Left  -     Pain Location knee  -     Row Name             Wound 10/08/18 0732 Left knee incision    Wound - Properties Group Date first assessed: 10/08/18  -MB Time first assessed: 0732  -MB Side: Left  -MB Location: knee  -MB Type: incision  -MB    Row Name 10/08/18 1131          Plan of Care Review    Plan of Care Reviewed With patient  -     Row Name 10/08/18 1131          Physical Therapy Clinical Impression    Patient/Family Goals Statement (PT Clinical Impression) return home to [facility]     Criteria for Skilled Interventions Met (PT Clinical Impression) treatment indicated  -     Impairments Found (describe specific impairments) gait, locomotion, and balance;ROM  -     Rehab Potential (PT Clinical Summary) good, to achieve stated therapy goals  -     Row Name 10/08/18 1131          Physical Therapy Goals    Bed Mobility Goal Selection (PT) bed mobility, PT goal 1  -     Transfer Goal Selection (PT) transfer, PT goal 1  -     Gait Training Goal Selection (PT) gait training, PT goal 1  -     ROM Goal Selection (PT) ROM, PT goal 1  -     Additional Documentation ROM Goal Selection (PT) (Row)  -     Row Name 10/08/18 1131          Bed Mobility Goal 1 (PT)    Activity/Assistive Device (Bed Mobility Goal 1, PT) bed mobility activities, all  -     [place] Level/Cues Needed (Bed Mobility Goal 1, PT) supervision required  -     Time Frame (Bed Mobility Goal 1, PT) 3 days  -     Row Name 10/08/18 1131          Transfer Goal 1 (PT)    Activity/Assistive Device (Transfer Goal 1, PT) transfers, all;walker,  rolling  -KH     [place] Level/Cues Needed (Transfer Goal 1, PT) supervision required  -KH     Time Frame (Transfer Goal 1, PT) 3 days  -     Row Name 10/08/18 1131          Gait Training Goal 1 (PT)    Activity/Assistive Device (Gait Training Goal 1, PT) gait (walking locomotion);walker, rolling  -KH     [place] Level (Gait Training Goal 1, PT) supervision required  -KH     Distance (Gait Goal 1, PT) 100 ft  -KH     Time Frame (Gait Training Goal 1, PT) 3 days  -     Row Name 10/08/18 1131          ROM Goal 1 (PT)    ROM Goal 1 (PT) L knee 0-90  -KH     Time Frame (ROM Goal 1, PT) 3 days  -     Row Name 10/08/18 1131          Patient Education Goal (PT)    Activity (Patient Education Goal, PT) HEP  -KH     [place]/Cues/Accuracy (Memory Goal 2, PT) demonstrates adequately  -KH     Time Frame (Patient Education Goal, PT) 3 days  -     Row Name 10/08/18 1131          Positioning and Restraints    Pre-Treatment Position in bed  -KH     Post Treatment Position chair  -KH     In Chair reclined;call light within reach;encouraged to call for assist;notified [facility]  -       User Key  (r) = Recorded By, (t) = Taken By, (c) = Cosigned By    Initials Name Provider Type    Carmela Hwang, PT Physical Therapist    Priscila Hastings, RN Registered Nurse          Physical Therapy Education     Title: PT OT SLP Therapies (Done)     Topic: Physical Therapy (Done)     Point: Mobility training (Done)    Learning Progress Summary     Learner Status Readiness Method Response Comment Documented by    Patient Done Acceptance EVETTE LEDEZMA NR KH 10/08/18 1323          Point: Home exercise program (Done)    Learning Progress Summary     Learner Status Readiness Method Response Comment Documented by    Patient Done Acceptance EVETTE LEDEZMA NR KH 10/08/18 1323          Point: Body mechanics (Done)    Learning Progress Summary     Learner Status Readiness Method Response Comment Documented by    Patient Done Acceptance  EVETTE LEDEZMA NR   10/08/18 1323          Point: Precautions (Done)    Learning Progress Summary     Learner Status Readiness Method Response Comment Documented by    Patient Done Acceptance EVETTE LEDEZMAMISSAEL   10/08/18 1323                      User Key     Initials Effective Dates Name Provider Type Discipline     06/08/18 -  Carmela Dill, PT Physical Therapist PT                PT Recommendation and Plan  Anticipated Discharge Disposition (PT): home with home health  Planned Therapy Interventions (PT Eval): balance training, bed mobility training, gait training, home exercise program, patient/family education, strengthening, ROM (range of motion), transfer training  Therapy Frequency (PT Clinical Impression): 2 times/day  Outcome Summary/Treatment Plan (PT)  Anticipated Discharge Disposition (PT): home with home health  Plan of Care Reviewed With: patient  Outcome Summary: Pt is s/p L TKa and presents with pain, limited ROM and difficulty walking. Pt would benefit from PT to address these impairments          Outcome Measures     Row Name 10/08/18 1300             How much help from another person do you currently need...    Turning from your back to your side while in flat bed without using bedrails? 3  -KH      Moving from lying on back to sitting on the side of a flat bed without bedrails? 3  -KH      Moving to and from a bed to a chair (including a wheelchair)? 3  -KH      Standing up from a chair using your arms (e.g., wheelchair, bedside chair)? 3  -KH      Climbing 3-5 steps with a railing? 2  -KH      To walk in hospital room? 2  -KH      AM-PAC 6 Clicks Score 16  -KH         Functional Assessment    Outcome Measure Options AM-PAC 6 Clicks Basic Mobility (PT)  -        User Key  (r) = Recorded By, (t) = Taken By, (c) = Cosigned By    Initials Name Provider Type    Carmela Hwang, PT Physical Therapist           Time Calculation:         PT Charges     Row Name 10/08/18 1210 10/08/18 1130           Time Calculation    Start Time  -- 1130  -     Stop Time --  - 1200  -KH     Time Calculation (min)  -- 30 min  -     PT Received On  -- 10/08/18  -     PT - Next Appointment  -- 10/09/18  -     PT Goal Re-Cert Due Date  -- 10/10/18  -       User Key  (r) = Recorded By, (t) = Taken By, (c) = Cosigned By    Initials Name Provider Type    Carmela Hwang, PT Physical Therapist        Therapy Suggested Charges     Code   Minutes Charges    None           Therapy Charges for Today     Code Description Service Date Service Provider Modifiers Qty    43141994509  PT EVAL MOD COMPLEXITY 2 10/8/2018 Carmela Dill, PT GP 1    94031392349  PT THER PROC EA 15 MIN 10/8/2018 Carmela Dill, PT GP 1          PT G-Codes  Outcome Measure Options: AM-PAC 6 Clicks Basic Mobility (PT)  AM-PAC 6 Clicks Score: 16      Carmela Dill PT  10/8/2018

## 2018-10-08 NOTE — PROGRESS NOTES
Discharge Planning Assessment  [facility]     Patient Name: Vidhya Ramirez  MRN: [id number]  Today's Date: 10/8/2018    Admit Date: 10/8/2018          Discharge Needs Assessment     Row Name 10/08/18 1447       Living Environment    Lives With alone    Current Living Arrangements home/apartment/condo    Family Caregiver if Needed child(leslee), adult    Quality of Family Relationships involved       Resource/Environmental Concerns    Resource/Environmental Concerns none       Transition Planning    Patient/Family Anticipates Transition to home with family    Patient/Family Anticipated Services at Transition home health care    Transportation Anticipated family or friend will provide       Discharge Needs Assessment    Readmission Within the Last 30 Days no previous admission in last 30 days    Concerns to be Addressed no discharge needs identified            Discharge Plan     Row Name 10/08/18 1448       Plan    Plan Home w/ family and [facility]     Patient/Family in Agreement with Plan yes    Plan Comments Facsesheet and d/c plan verified per Larry/Mell Quezada. Plan is for pt to d/c home w/ the help of her adult children and [facility]. Amanda/[facility] notified.         Destination     No service coordination in this encounter.      Durable Medical Equipment     No service coordination in this encounter.      Dialysis/Infusion     No service coordination in this encounter.      Home Medical Care - Selection Complete     Service Request Status Selected Specialties Address Phone Number Fax Number    [facility] [place] Selected Home Health Services [address] [address] [phone]      Social Care     No service coordination in this encounter.                Demographic Summary    No documentation.           Functional Status    No documentation.           Psychosocial    No documentation.           Abuse/Neglect    No documentation.           Legal    No documentation.            Substance Abuse    No documentation.           Patient Forms     Row Name 10/08/18 6128       Patient Forms    Provider Choice List --   Per Larry osorio.           Nemo Kuo RN

## 2018-10-09 LAB
ANION GAP SERPL CALCULATED.3IONS-SCNC: 9.3 MMOL/L
BUN BLD-MCNC: 24 MG/DL (ref 8–23)
BUN/CREAT SERPL: 25.3 (ref 7–25)
CALCIUM SPEC-SCNC: 8.8 MG/DL (ref 8.6–10.5)
CHLORIDE SERPL-SCNC: 103 MMOL/L (ref 98–107)
CO2 SERPL-SCNC: 24.7 MMOL/L (ref 22–29)
CREAT BLD-MCNC: 0.95 MG/DL (ref 0.57–1)
GFR SERPL CREATININE-BSD FRML MDRD: 57 ML/MIN/1.73
GLUCOSE BLD-MCNC: 168 MG/DL (ref 65–99)
GLUCOSE BLDC GLUCOMTR-MCNC: 130 MG/DL (ref 70–130)
HCT VFR BLD AUTO: 32.2 % (ref 35.6–45.5)
HGB BLD-MCNC: 10.7 G/DL (ref 11.9–15.5)
POTASSIUM BLD-SCNC: 4.5 MMOL/L (ref 3.5–5.2)
SODIUM BLD-SCNC: 137 MMOL/L (ref 136–145)

## 2018-10-09 PROCEDURE — 97110 THERAPEUTIC EXERCISES: CPT

## 2018-10-09 PROCEDURE — 25010000003 CEFAZOLIN IN DEXTROSE 2-4 GM/100ML-% SOLUTION: Performed by: ORTHOPAEDIC SURGERY

## 2018-10-09 PROCEDURE — 25010000002 FONDAPARINUX PER 0.5 MG: Performed by: ORTHOPAEDIC SURGERY

## 2018-10-09 PROCEDURE — 97150 GROUP THERAPEUTIC PROCEDURES: CPT

## 2018-10-09 PROCEDURE — 85018 HEMOGLOBIN: CPT | Performed by: ORTHOPAEDIC SURGERY

## 2018-10-09 PROCEDURE — 82962 GLUCOSE BLOOD TEST: CPT

## 2018-10-09 PROCEDURE — 85014 HEMATOCRIT: CPT | Performed by: ORTHOPAEDIC SURGERY

## 2018-10-09 PROCEDURE — 25010000002 KETOROLAC TROMETHAMINE PER 15 MG: Performed by: ORTHOPAEDIC SURGERY

## 2018-10-09 PROCEDURE — 80048 BASIC METABOLIC PNL TOTAL CA: CPT | Performed by: ORTHOPAEDIC SURGERY

## 2018-10-09 RX ORDER — FAMOTIDINE 40 MG/1
40 TABLET, FILM COATED ORAL 2 TIMES DAILY
Status: DISCONTINUED | OUTPATIENT
Start: 2018-10-09 | End: 2018-10-11 | Stop reason: HOSPADM

## 2018-10-09 RX ORDER — FONDAPARINUX SODIUM 2.5 MG/.5ML
2.5 INJECTION SUBCUTANEOUS ONCE
Status: COMPLETED | OUTPATIENT
Start: 2018-10-09 | End: 2018-10-09

## 2018-10-09 RX ORDER — HYDROCODONE BITARTRATE AND ACETAMINOPHEN 7.5; 325 MG/1; MG/1
2 TABLET ORAL EVERY 4 HOURS PRN
Status: DISCONTINUED | OUTPATIENT
Start: 2018-10-09 | End: 2018-10-11 | Stop reason: HOSPADM

## 2018-10-09 RX ORDER — HYDROCODONE BITARTRATE AND ACETAMINOPHEN 7.5; 325 MG/1; MG/1
1 TABLET ORAL EVERY 4 HOURS PRN
Status: DISCONTINUED | OUTPATIENT
Start: 2018-10-09 | End: 2018-10-11 | Stop reason: HOSPADM

## 2018-10-09 RX ADMIN — ONDANSETRON 4 MG: 4 TABLET, ORALLY DISINTEGRATING ORAL at 05:17

## 2018-10-09 RX ADMIN — ATENOLOL 50 MG: 50 TABLET ORAL at 17:26

## 2018-10-09 RX ADMIN — OXYCODONE AND ACETAMINOPHEN 1 TABLET: 5; 325 TABLET ORAL at 05:17

## 2018-10-09 RX ADMIN — Medication 3 ML: at 08:02

## 2018-10-09 RX ADMIN — LEVOTHYROXINE SODIUM 50 MCG: 50 TABLET ORAL at 08:01

## 2018-10-09 RX ADMIN — ACETAMINOPHEN 325 MG: 325 TABLET, FILM COATED ORAL at 08:01

## 2018-10-09 RX ADMIN — ASPIRIN 325 MG: 325 TABLET, DELAYED RELEASE ORAL at 08:02

## 2018-10-09 RX ADMIN — ACETAMINOPHEN 650 MG: 325 TABLET, FILM COATED ORAL at 15:47

## 2018-10-09 RX ADMIN — KETOROLAC TROMETHAMINE 15 MG: 15 INJECTION, SOLUTION INTRAMUSCULAR; INTRAVENOUS at 00:31

## 2018-10-09 RX ADMIN — LISINOPRIL 10 MG: 10 TABLET ORAL at 08:02

## 2018-10-09 RX ADMIN — HYDROCODONE BITARTRATE AND ACETAMINOPHEN 1 TABLET: 7.5; 325 TABLET ORAL at 21:22

## 2018-10-09 RX ADMIN — KETOROLAC TROMETHAMINE 15 MG: 15 INJECTION, SOLUTION INTRAMUSCULAR; INTRAVENOUS at 07:45

## 2018-10-09 RX ADMIN — FAMOTIDINE 40 MG: 40 TABLET, FILM COATED ORAL at 20:14

## 2018-10-09 RX ADMIN — ACETAMINOPHEN 650 MG: 325 TABLET, FILM COATED ORAL at 12:14

## 2018-10-09 RX ADMIN — CEFAZOLIN SODIUM 2 G: 2 INJECTION, SOLUTION INTRAVENOUS at 00:28

## 2018-10-09 RX ADMIN — ATORVASTATIN CALCIUM 20 MG: 20 TABLET, FILM COATED ORAL at 08:01

## 2018-10-09 RX ADMIN — FONDAPARINUX SODIUM 2.5 MG: 2.5 INJECTION, SOLUTION SUBCUTANEOUS at 08:03

## 2018-10-09 RX ADMIN — Medication 3 ML: at 20:14

## 2018-10-09 RX ADMIN — HYDROCODONE BITARTRATE AND ACETAMINOPHEN 1 TABLET: 7.5; 325 TABLET ORAL at 17:09

## 2018-10-09 RX ADMIN — FERROUS SULFATE TAB 325 MG (65 MG ELEMENTAL FE) 325 MG: 325 (65 FE) TAB at 08:02

## 2018-10-09 NOTE — THERAPY TREATMENT NOTE
Acute Care - Physical Therapy Treatment Note  [facility]     Patient Name: Vidhya Ramirez  : 1940  MRN: [id number]  Today's Date: 10/9/2018             Admit Date: 10/8/2018    Visit Dx:    ICD-10-CM ICD-9-CM   1. Primary osteoarthritis of left knee M17.12 715.16     Patient Active Problem List   Diagnosis   • DJD (degenerative joint disease) of knee       Therapy Treatment          Rehabilitation Treatment Summary     Row Name 10/09/18 1400 10/09/18 1000          Treatment Time/Intention    Discipline physical therapy assistant  -CW physical therapy assistant  -CW     Document Type therapy note (daily note)  -CW therapy note (daily note)  -CW     Subjective Information complains of;pain  -CW complains of;pain  -CW     Mode of Treatment physical therapy  -CW physical therapy  -CW     Therapy Frequency (PT Clinical Impression) 2 times/day  -CW 2 times/day  -CW     Patient Effort good  -CW good  -CW     Existing Precautions/Restrictions fall  -CW fall  -CW     Recorded by [CW] Simon Villegas P, PTA 10/09/18 1415 [CW] Simon Villegas P, PTA 10/09/18 1045     Row Name 10/09/18 1400 10/09/18 1000          Vital Signs    O2 Delivery Pre Treatment room air  -CW room air  -CW     Recorded by [CW] Simon Villegas P, PTA 10/09/18 1415 [CW] Simon Villegas P, PTA 10/09/18 1045     Row Name 10/09/18 1400 10/09/18 1000          Cognitive Assessment/Intervention- PT/OT    Orientation Status (Cognition) oriented x 4  -CW oriented x 4  -CW     Follows Commands (Cognition) WNL  -CW WNL  -CW     Personal Safety Interventions fall prevention program maintained;gait belt;muscle strengthening facilitated;nonskid shoes/slippers when out of bed  -CW fall prevention program maintained;gait belt;muscle strengthening facilitated;nonskid shoes/slippers when out of bed  -CW     Recorded by [CW] Simon Villegas P, PTA 10/09/18 1415 [CW] Simon Villegas P, PTA 10/09/18 1045     Row Name 10/09/18 1400 10/09/18 1000           Bed Mobility Assessment/Treatment    Comment (Bed Mobility) in chair  -CW in chair  -CW     Recorded by [CW] Simon Villegas, PTA 10/09/18 1415 [CW] Simon Villegas, PTA 10/09/18 1045     Row Name 10/09/18 1400 10/09/18 1000          Transfer Assessment/Treatment    Transfer Assessment/Treatment sit-stand transfer;stand-sit transfer  -CW sit-stand transfer;stand-sit transfer  -CW     Recorded by [CW] Simon Villegas, PTA 10/09/18 1415 [CW] Simon Villegas, PTA 10/09/18 1045     Row Name 10/09/18 1400 10/09/18 1000          Sit-Stand Transfer    Sit-Stand [place] (Transfers) supervision  -CW supervision  -CW     Assistive Device (Sit-Stand Transfers) walker, front-wheeled  -CW walker, front-wheeled  -CW     Recorded by [CW] Simon Villegas, PTA 10/09/18 1415 [CW] Simon Villegas, PTA 10/09/18 1045     Row Name 10/09/18 1400 10/09/18 1000          Stand-Sit Transfer    Stand-Sit [place] (Transfers) supervision  -CW supervision  -CW     Assistive Device (Stand-Sit Transfers) walker, front-wheeled  -CW walker, front-wheeled  -CW     Recorded by [CW] Simon Villegas, PTA 10/09/18 1415 [CW] Simon Villegas, PTA 10/09/18 1045     Row Name 10/09/18 1400 10/09/18 1000          Gait/Stairs Assessment/Training    [place] Level (Gait) supervision  -CW supervision  -CW     Assistive Device (Gait) walker, front-wheeled  -CW walker, front-wheeled  -CW     Distance in Feet (Gait) 40  -CW 40  -CW     Pattern (Gait) step-to  -CW step-to  -CW     Deviations/Abnormal Patterns (Gait) antalgic;gait speed decreased;stride length decreased  -CW antalgic;gait speed decreased;stride length decreased  -CW     Recorded by [CW] Simon Villegas, PTA 10/09/18 1415 [CW] Simon Villegas, PTA 10/09/18 1045     Row Name 10/09/18 1000             General ROM    GENERAL ROM COMMENTS 5-80  -CW      Recorded by [CW] Simon Villegas, PTA 10/09/18 1045      Row Name 10/09/18 1400 10/09/18 1000           Therapeutic Exercise    Comment (Therapeutic Exercise) L TKA Protocol 20 reps  -CW L TKA Protocol 15 reps  -CW     Recorded by [CW] Simon Villegas P, PTA 10/09/18 1415 [CW] Simon Villegas P, PTA 10/09/18 1045     Row Name 10/09/18 1400 10/09/18 1000          Positioning and Restraints    Pre-Treatment Position sitting in chair/recliner  -CW sitting in chair/recliner  -CW     Post Treatment Position chair  -CW chair  -CW     In Chair notified nsg;reclined;call light within reach;encouraged to call for assist  -CW notified nsg;reclined;call light within reach;encouraged to call for assist  -CW     Recorded by [CW] Simon Villegas, PTA 10/09/18 1415 [CW] Simon Villegas, PTA 10/09/18 1045     Row Name 10/09/18 1400 10/09/18 1000          Pain Assessment    Additional Documentation Pain Scale: Numbers Pre/Post-Treatment (Group)  -CW Pain Scale: Numbers Pre/Post-Treatment (Group)  -CW     Recorded by [CW] Simon Villegas P, PTA 10/09/18 1415 [CW] Simon Villegas, PTA 10/09/18 1045     Row Name 10/09/18 1400 10/09/18 1000          Pain Scale: Numbers Pre/Post-Treatment    Pain Scale: Numbers, Pretreatment 6/10  -CW 6/10  -CW     Pain Scale: Numbers, Post-Treatment 6/10  -CW 6/10  -CW     Pain Location - Side Left  -CW Left  -CW     Pain Location knee  -CW knee  -CW     Pain Intervention(s)  -- Repositioned;Ambulation/increased activity  -CW     Recorded by [CW] Simon Villegas P, PTA 10/09/18 1415 [CW] Simon Villegas, PTA 10/09/18 1045     Row Name                Wound 10/08/18 0732 Left knee incision    Wound - Properties Group Date first assessed: 10/08/18 [MB] Time first assessed: 0732 [MB] Side: Left [MB] Location: knee [MB] Type: incision [MB] Recorded by:  [MB] Priscila Bethea RN 10/08/18 0732    Row Name 10/09/18 1400 10/09/18 1000          Outcome Summary/Treatment Plan (PT)    Anticipated Discharge Disposition (PT) home with home health  -CW home with home health  -CW      Recorded by [CW] Simon Villegas, PTA 10/09/18 1415 [CW] Simon Villegas, PTA 10/09/18 1045       User Key  (r) = Recorded By, (t) = Taken By, (c) = Cosigned By    Initials Name Effective Dates Discipline    Priscila Hastings, RN 06/16/16 -  Nurse    CW Simon Villegas, PTA 03/07/18 -  PT          Wound 10/08/18 0732 Left knee incision (Active)   Dressing Appearance dry;intact;no drainage 10/9/2018 12:15 PM   Closure KEYLA 10/9/2018 12:15 PM   Base dressing in place, unable to visualize 10/9/2018 12:15 PM   Drainage Characteristics/Odor serosanguineous 10/9/2018  5:30 AM   Drainage Amount none 10/9/2018 12:15 PM   Dressing Care, Wound dressing changed;dressing applied 10/9/2018  5:30 AM             Physical Therapy Education     Title: PT OT SLP Therapies (Resolved)     Topic: Physical Therapy (Resolved)     Point: Mobility training (Resolved)    Learning Progress Summary     Learner Status Readiness Method Response Comment Documented by    Patient Done Acceptance EVETTE LEDEZMA NR   10/08/18 1323          Point: Home exercise program (Resolved)    Learning Progress Summary     Learner Status Readiness Method Response Comment Documented by    Patient Done Acceptance EVETTE LEDEZMA NR   10/08/18 1323          Point: Body mechanics (Resolved)    Learning Progress Summary     Learner Status Readiness Method Response Comment Documented by    Patient Done Acceptance EVETTE LEDEZMA NR   10/08/18 1323          Point: Precautions (Resolved)    Learning Progress Summary     Learner Status Readiness Method Response Comment Documented by    Patient Done Acceptance EVETTE LEDEZMA NR   10/08/18 1323                      User Key     Initials Effective Dates Name Provider Type Discipline     06/08/18 -  Carmela Dill, PT Physical Therapist PT                    PT Recommendation and Plan  Anticipated Discharge Disposition (PT): home with home health  Therapy Frequency (PT Clinical Impression): 2 times/day  Outcome  Summary/Treatment Plan (PT)  Anticipated Discharge Disposition (PT): home with home health  Plan of Care Reviewed With: patient  Progress: improving  Outcome Summary: Pt increasing with strength and balance with use of WRX to improve with transfers and amb safety with RWX          Outcome Measures     Row Name 10/09/18 1000 10/08/18 1300          How much help from another person do you currently need...    Turning from your back to your side while in flat bed without using bedrails? 3  -CW 3  -KH     Moving from lying on back to sitting on the side of a flat bed without bedrails? 3  -CW 3  -KH     Moving to and from a bed to a chair (including a wheelchair)? 3  -CW 3  -KH     Standing up from a chair using your arms (e.g., wheelchair, bedside chair)? 3  -CW 3  -KH     Climbing 3-5 steps with a railing? 2  -CW 2  -KH     To walk in hospital room? 3  -CW 2  -KH     AM-PAC 6 Clicks Score 17  -CW 16  -KH        Functional Assessment    Outcome Measure Options AM-PAC 6 Clicks Basic Mobility (PT)  -CW AM-PAC 6 Clicks Basic Mobility (PT)  -KH       User Key  (r) = Recorded By, (t) = Taken By, (c) = Cosigned By    Initials Name Provider Type    Carmela Hwang, PT Physical Therapist    Simon Colindres PTA Physical Therapy Assistant           Time Calculation:         PT Charges     Row Name 10/09/18 1430 10/09/18 1046          Time Calculation    Start Time 1400  -CW 0928  -CW     Stop Time 1430  -CW 1016  -CW     Time Calculation (min) 30 min  -CW 48 min  -CW     PT Received On 10/09/18  -CW 10/09/18  -CW     PT - Next Appointment 10/10/18  -CW 10/09/18  -CW       User Key  (r) = Recorded By, (t) = Taken By, (c) = Cosigned By    Initials Name Provider Type    Simon Colindres PTA Physical Therapy Assistant        Therapy Suggested Charges     Code   Minutes Charges    None           Therapy Charges for Today     Code Description Service Date Service Provider Modifiers Qty    16976832236 HC PT  THER PROC EA 15 MIN 10/9/2018 Simon Villegas, PTA GP 1    22547159306 HC PT THER PROC GROUP 10/9/2018 Simon Villegas, PTA GP 1    84873156658 HC PT THER PROC GROUP 10/9/2018 Simon Villegas, PTA GP 1    56697482400 HC PT THER PROC EA 15 MIN 10/9/2018 Simon Villegas, PTA GP 1          PT G-Codes  Outcome Measure Options: AM-PAC 6 Clicks Basic Mobility (PT)  AM-PAC 6 Clicks Score: 17    Simon Villegas PTA  10/9/2018

## 2018-10-09 NOTE — PLAN OF CARE
Problem: Patient Care Overview  Goal: Plan of Care Review  Outcome: Outcome(s) achieved Date Met: 10/09/18   10/09/18 1045 10/09/18 1829   OTHER   Outcome Summary --  A&Ox4. C/o tolerated po tylenol this am and afternoon. C/O increasing pain after therapy. Dr. Ramos called to be notified that patient stated that percocet was not helping and causing her to be dizzy. Orders changed to lortab. patient tolerating it well at this point. Up with assistance. Voiding on her own. Dressing to left knee c/d/i and changed. Pedal pulses 2+. Good sensation and motion to bilateral feet. Vitals are stable.    Coping/Psychosocial   Plan of Care Reviewed With patient --    Plan of Care Review   Progress improving --      Goal: Individualization and Mutuality  Outcome: Ongoing (interventions implemented as appropriate)    Goal: Discharge Needs Assessment  Outcome: Ongoing (interventions implemented as appropriate)    Goal: Interprofessional Rounds/Family Conf  Outcome: Ongoing (interventions implemented as appropriate)      Problem: Fall Risk (Adult)  Goal: Absence of Fall  Outcome: Ongoing (interventions implemented as appropriate)      Problem: Knee Arthroplasty (Total, Partial) (Adult)  Goal: Anesthesia/Sedation Recovery  Outcome: Ongoing (interventions implemented as appropriate)

## 2018-10-09 NOTE — PLAN OF CARE
Problem: Patient Care Overview  Goal: Plan of Care Review  Outcome: Ongoing (interventions implemented as appropriate)   10/09/18 1045   OTHER   Outcome Summary Pt increasing with strength and balance with use of RWX to improve with transfers and amb safety with RWX   Coping/Psychosocial   Plan of Care Reviewed With patient   Plan of Care Review   Progress improving

## 2018-10-09 NOTE — THERAPY TREATMENT NOTE
Acute Care - Physical Therapy Treatment Note  [facility]     Patient Name: Vidhya Ramirez  : 1940  MRN: [id number]  Today's Date: 10/9/2018             Admit Date: 10/8/2018    Visit Dx:    ICD-10-CM ICD-9-CM   1. Primary osteoarthritis of left knee M17.12 715.16     Patient Active Problem List   Diagnosis   • DJD (degenerative joint disease) of knee       Therapy Treatment          Rehabilitation Treatment Summary     Row Name 10/09/18 1000             Treatment Time/Intention    Discipline physical therapy assistant  -CW      Document Type therapy note (daily note)  -CW      Subjective Information complains of;pain  -CW      Mode of Treatment physical therapy  -CW      Therapy Frequency (PT Clinical Impression) 2 times/day  -CW      Patient Effort good  -CW      Existing Precautions/Restrictions fall  -CW      Recorded by [CW] Simon Villegas, PTA 10/09/18 1045      Row Name 10/09/18 1000             Vital Signs    O2 Delivery Pre Treatment room air  -CW      Recorded by [CW] Simon Villegas PTA 10/09/18 1045      Row Name 10/09/18 1000             Cognitive Assessment/Intervention- PT/OT    Orientation Status (Cognition) oriented x 4  -CW      Follows Commands (Cognition) WNL  -CW      Personal Safety Interventions fall prevention program maintained;gait belt;muscle strengthening facilitated;nonskid shoes/slippers when out of bed  -CW      Recorded by [CW] Simon Villegas, PTA 10/09/18 1045      Row Name 10/09/18 1000             Bed Mobility Assessment/Treatment    Comment (Bed Mobility) in chair  -CW      Recorded by [CW] Simon Villegas PTA 10/09/18 1045      Row Name 10/09/18 1000             Transfer Assessment/Treatment    Transfer Assessment/Treatment sit-stand transfer;stand-sit transfer  -CW      Recorded by [CW] Simon Villegas PTA 10/09/18 1045      Row Name 10/09/18 1000             Sit-Stand Transfer    Sit-Stand [place] (Transfers) supervision  -CW       Assistive Device (Sit-Stand Transfers) walker, front-wheeled  -CW      Recorded by [CW] Simon Villegas, PTA 10/09/18 1045      Row Name 10/09/18 1000             Stand-Sit Transfer    Stand-Sit [place] (Transfers) supervision  -CW      Assistive Device (Stand-Sit Transfers) walker, front-wheeled  -CW      Recorded by [CW] Simon Villegas, PTA 10/09/18 1045      Row Name 10/09/18 1000             Gait/Stairs Assessment/Training    [place] Level (Gait) supervision  -CW      Assistive Device (Gait) walker, front-wheeled  -CW      Distance in Feet (Gait) 40  -CW      Pattern (Gait) step-to  -CW      Deviations/Abnormal Patterns (Gait) antalgic;gait speed decreased;stride length decreased  -CW      Recorded by [CW] Simon Villegas, PTA 10/09/18 1045      Row Name 10/09/18 1000             General ROM    GENERAL ROM COMMENTS 5-80  -CW      Recorded by [CW] Simon Villegas, PTA 10/09/18 1045      Row Name 10/09/18 1000             Therapeutic Exercise    Comment (Therapeutic Exercise) L TKA Protocol 15 reps  -CW      Recorded by [CW] Simon Villegas, PTA 10/09/18 1045      Row Name 10/09/18 1000             Positioning and Restraints    Pre-Treatment Position sitting in chair/recliner  -CW      Post Treatment Position chair  -CW      In Chair notified nsg;reclined;call light within reach;encouraged to call for assist  -CW      Recorded by [CW] Simon Villegas, PTA 10/09/18 1045      Row Name 10/09/18 1000             Pain Assessment    Additional Documentation Pain Scale: Numbers Pre/Post-Treatment (Group)  -CW      Recorded by [CW] Simon Villegas, PTA 10/09/18 1045      Row Name 10/09/18 1000             Pain Scale: Numbers Pre/Post-Treatment    Pain Scale: Numbers, Pretreatment 6/10  -CW      Pain Scale: Numbers, Post-Treatment 6/10  -CW      Pain Location - Side Left  -CW      Pain Location knee  -CW      Pain Intervention(s) Repositioned;Ambulation/increased activity  -CW       Recorded by [CW] Simon Villegas, PTA 10/09/18 1045      Row Name                Wound 10/08/18 0732 Left knee incision    Wound - Properties Group Date first assessed: 10/08/18 [MB] Time first assessed: 0732 [MB] Side: Left [MB] Location: knee [MB] Type: incision [MB] Recorded by:  [MB] Priscila Bethea RN 10/08/18 0732    Row Name 10/09/18 1000             Outcome Summary/Treatment Plan (PT)    Anticipated Discharge Disposition (PT) home with home health  -CW      Recorded by [CW] Simon Villegas, PTA 10/09/18 1045        User Key  (r) = Recorded By, (t) = Taken By, (c) = Cosigned By    Initials Name Effective Dates Discipline    Priscila Hastings RN 06/16/16 -  Nurse    CW Simon Villegas, PTA 03/07/18 -  PT          Wound 10/08/18 0732 Left knee incision (Active)   Dressing Appearance dry;intact;no drainage 10/9/2018  8:03 AM   Closure KEYLA 10/9/2018  8:03 AM   Base dressing in place, unable to visualize 10/9/2018  8:03 AM   Drainage Characteristics/Odor serosanguineous 10/9/2018  5:30 AM   Drainage Amount none 10/9/2018  8:03 AM   Dressing Care, Wound dressing changed;dressing applied 10/9/2018  5:30 AM             Physical Therapy Education     Title: PT OT SLP Therapies (Resolved)     Topic: Physical Therapy (Resolved)     Point: Mobility training (Resolved)    Learning Progress Summary     Learner Status Readiness Method Response Comment Documented by    Patient Done Acceptance EVETTE LEDEZMA NR KH 10/08/18 1323          Point: Home exercise program (Resolved)    Learning Progress Summary     Learner Status Readiness Method Response Comment Documented by    Patient Done Acceptance EVETTE LEDEZMA NR KH 10/08/18 1323          Point: Body mechanics (Resolved)    Learning Progress Summary     Learner Status Readiness Method Response Comment Documented by    Patient Done Acceptance EVETTE LEDEZMA NR KH 10/08/18 1323          Point: Precautions (Resolved)    Learning Progress Summary     Learner Status Readiness Method  Response Comment Documented by    Patient Done Acceptance EVETTE LEDEZMA NR   10/08/18 1323                      User Key     Initials Effective Dates Name Provider Type Discipline     06/08/18 -  Carmela Dill, PT Physical Therapist PT                    PT Recommendation and Plan  Anticipated Discharge Disposition (PT): home with home health  Therapy Frequency (PT Clinical Impression): 2 times/day  Outcome Summary/Treatment Plan (PT)  Anticipated Discharge Disposition (PT): home with home health  Plan of Care Reviewed With: patient  Progress: improving  Outcome Summary: Pt increasing with strength and balance with use of WRX to improve with transfers and amb safety with RWX          Outcome Measures     Row Name 10/09/18 1000 10/08/18 1300          How much help from another person do you currently need...    Turning from your back to your side while in flat bed without using bedrails? 3  -CW 3  -KH     Moving from lying on back to sitting on the side of a flat bed without bedrails? 3  -CW 3  -KH     Moving to and from a bed to a chair (including a wheelchair)? 3  -CW 3  -KH     Standing up from a chair using your arms (e.g., wheelchair, bedside chair)? 3  -CW 3  -KH     Climbing 3-5 steps with a railing? 2  -CW 2  -KH     To walk in hospital room? 3  -CW 2  -KH     AM-PAC 6 Clicks Score 17  -CW 16  -KH        Functional Assessment    Outcome Measure Options AM-PAC 6 Clicks Basic Mobility (PT)  -CW AM-PAC 6 Clicks Basic Mobility (PT)  -KH       User Key  (r) = Recorded By, (t) = Taken By, (c) = Cosigned By    Initials Name Provider Type     Carmela Dill, PT Physical Therapist    Simon Colindres, PTA Physical Therapy Assistant           Time Calculation:         PT Charges     Row Name 10/09/18 1046             Time Calculation    Start Time 0928  -CW      Stop Time 1016  -CW      Time Calculation (min) 48 min  -CW      PT Received On 10/09/18  -CW      PT - Next Appointment 10/09/18  -CW         User Key  (r) = Recorded By, (t) = Taken By, (c) = Cosigned By    Initials Name Provider Type    CW Simon Villegas, JHOAN Physical Therapy Assistant        Therapy Suggested Charges     Code   Minutes Charges    None           Therapy Charges for Today     Code Description Service Date Service Provider Modifiers Qty    43927898513 HC PT THER PROC EA 15 MIN 10/9/2018 Simon Villegas, JHOAN GP 1    82391231381 HC PT THER PROC GROUP 10/9/2018 Simon Villegas PTA GP 1          PT G-Codes  Outcome Measure Options: AM-PAC 6 Clicks Basic Mobility (PT)  AM-PAC 6 Clicks Score: 17    Simon Villegas PTA  10/9/2018

## 2018-10-09 NOTE — PLAN OF CARE
Problem: Patient Care Overview  Goal: Plan of Care Review  Outcome: Ongoing (interventions implemented as appropriate)   10/09/18 0130   OTHER   Outcome Summary vss, dsg c/d/i, neurovascular status wnl, voiding well, states nausea relieved with phenergan , reports adequate pain control, educated on importance of monitoring b/p due to hx hypertension, possible discharge today , will continue to monitor   Coping/Psychosocial   Plan of Care Reviewed With patient   Plan of Care Review   Progress improving     Goal: Individualization and Mutuality  Outcome: Ongoing (interventions implemented as appropriate)    Goal: Discharge Needs Assessment  Outcome: Ongoing (interventions implemented as appropriate)    Goal: Interprofessional Rounds/Family Conf  Outcome: Ongoing (interventions implemented as appropriate)      Problem: Fall Risk (Adult)  Goal: Identify Related Risk Factors and Signs and Symptoms  Outcome: Outcome(s) achieved Date Met: 10/09/18    Goal: Absence of Fall  Outcome: Ongoing (interventions implemented as appropriate)      Problem: Knee Arthroplasty (Total, Partial) (Adult)  Goal: Signs and Symptoms of Listed Potential Problems Will be Absent, Minimized or Managed (Knee Arthroplasty)  Outcome: Outcome(s) achieved Date Met: 10/09/18    Goal: Anesthesia/Sedation Recovery  Outcome: Ongoing (interventions implemented as appropriate)

## 2018-10-09 NOTE — PROGRESS NOTES
"/59 (BP Location: Left arm, Patient Position: Lying)   Pulse 56   Temp 97.7 °F (36.5 °C) (Oral)   Resp 18   Ht 165.1 cm (65\")   Wt 79 kg (174 lb 1.6 oz)   SpO2 94%   BMI 28.97 kg/m²     Lab Results (last 24 hours)     Procedure Component Value Units Date/Time    Basic Metabolic Panel [[id number]]  (Abnormal) Collected:  10/09/18 0339    Specimen:  Blood Updated:  10/09/18 0449     Glucose 168 (H) mg/dL      BUN 24 (H) mg/dL      Creatinine 0.95 mg/dL      Sodium 137 mmol/L      Potassium 4.5 mmol/L      Chloride 103 mmol/L      CO2 24.7 mmol/L      Calcium 8.8 mg/dL      eGFR Non African Amer 57 (L) mL/min/1.73      BUN/Creatinine Ratio 25.3 (H)     Anion Gap 9.3 mmol/L     Narrative:       The MDRD GFR formula is only valid for adults with stable renal function between ages 18 and 70.    Hemoglobin & Hematocrit, Blood [[id number]]  (Abnormal) Collected:  10/09/18 0339    Specimen:  Blood Updated:  10/09/18 0440     Hemoglobin 10.7 (L) g/dL      Hematocrit 32.2 (L) %           Imaging Results (last 24 hours)     Procedure Component Value Units Date/Time    XR Knee 1 or 2 View Left [[id number]] Collected:  10/08/18 0921     Updated:  10/08/18 0924    Narrative:       LEFT KNEE: AP, LATERAL     HISTORY: Left total knee arthroplasty.     FINDINGS: There has been left total knee arthroplasty and patellar  resurfacing, and the components appear in good position. Recent surgical  changes include soft tissue gas and a surgical drain.     This report was finalized on 10/8/2018 9:21 AM by Dr. Clem Bills M.D.             Patient Care Team:  Elisabeth Gold MD as PCP - General (Family Medicine)    SUBJECTIVE  comfortable  PHYSICAL EXAM  NV intact   Active Problems:    DJD (degenerative joint disease) of knee      PLAN / DISPOSITION:  Arixtra one time dose  Lives alone: plan HH discharge Thursday with daughter helping her at home  Guido Ramos MD  10/09/18  6:43 AM      "

## 2018-10-10 PROCEDURE — 97150 GROUP THERAPEUTIC PROCEDURES: CPT

## 2018-10-10 PROCEDURE — 97110 THERAPEUTIC EXERCISES: CPT

## 2018-10-10 RX ADMIN — FAMOTIDINE 40 MG: 40 TABLET, FILM COATED ORAL at 22:30

## 2018-10-10 RX ADMIN — ATENOLOL 50 MG: 50 TABLET ORAL at 22:29

## 2018-10-10 RX ADMIN — HYDROCODONE BITARTRATE AND ACETAMINOPHEN 1 TABLET: 7.5; 325 TABLET ORAL at 09:14

## 2018-10-10 RX ADMIN — LISINOPRIL 10 MG: 10 TABLET ORAL at 12:55

## 2018-10-10 RX ADMIN — ATORVASTATIN CALCIUM 20 MG: 20 TABLET, FILM COATED ORAL at 22:30

## 2018-10-10 RX ADMIN — Medication 3 ML: at 22:31

## 2018-10-10 RX ADMIN — HYDROCODONE BITARTRATE AND ACETAMINOPHEN 1 TABLET: 7.5; 325 TABLET ORAL at 17:28

## 2018-10-10 RX ADMIN — LEVOTHYROXINE SODIUM 50 MCG: 50 TABLET ORAL at 09:14

## 2018-10-10 RX ADMIN — HYDROCODONE BITARTRATE AND ACETAMINOPHEN 1 TABLET: 7.5; 325 TABLET ORAL at 05:43

## 2018-10-10 RX ADMIN — Medication 3 ML: at 09:14

## 2018-10-10 RX ADMIN — FERROUS SULFATE TAB 325 MG (65 MG ELEMENTAL FE) 325 MG: 325 (65 FE) TAB at 09:14

## 2018-10-10 RX ADMIN — FAMOTIDINE 40 MG: 40 TABLET, FILM COATED ORAL at 09:14

## 2018-10-10 RX ADMIN — ASPIRIN 325 MG: 325 TABLET, DELAYED RELEASE ORAL at 09:14

## 2018-10-10 RX ADMIN — HYDROCODONE BITARTRATE AND ACETAMINOPHEN 1 TABLET: 7.5; 325 TABLET ORAL at 01:32

## 2018-10-10 RX ADMIN — HYDROCODONE BITARTRATE AND ACETAMINOPHEN 2 TABLET: 7.5; 325 TABLET ORAL at 12:55

## 2018-10-10 NOTE — PROGRESS NOTES
"/58 (BP Location: Left arm, Patient Position: Lying)   Pulse 70   Temp 98.8 °F (37.1 °C)   Resp 16   Ht 165.1 cm (65\")   Wt 79 kg (174 lb 1.6 oz)   SpO2 96%   BMI 28.97 kg/m²       Results from last 7 days  Lab Units 10/09/18  0339   HEMOGLOBIN g/dL 10.7*   HEMATOCRIT % 32.2*         Results from last 7 days  Lab Units 10/09/18  0339   SODIUM mmol/L 137   POTASSIUM mmol/L 4.5   CHLORIDE mmol/L 103   CO2 mmol/L 24.7   BUN mg/dL 24*   CREATININE mg/dL 0.95   GLUCOSE mg/dL 168*   CALCIUM mg/dL 8.8       Imaging Results (last 24 hours)     ** No results found for the last 24 hours. **          Patient Care Team:  Elisabeth Gold MD as PCP - General (Family Medicine)    SUBJECTIVE  Doing well  PHYSICAL EXAM  Wound with bloody drainage but no erythema     DJD (degenerative joint disease) of knee      PLAN / DISPOSITION:  D/C to home with daughter tomorrow  Henna DAPHNE Ponce  10/10/18  7:19 AM    "

## 2018-10-10 NOTE — PLAN OF CARE
Problem: Patient Care Overview  Goal: Plan of Care Review  Outcome: Ongoing (interventions implemented as appropriate)   10/10/18 0957   OTHER   Outcome Summary Pt increasing with strength and ROM to improve with transfers and amb safety with RWX   Coping/Psychosocial   Plan of Care Reviewed With patient   Plan of Care Review   Progress improving

## 2018-10-10 NOTE — THERAPY TREATMENT NOTE
Acute Care - Physical Therapy Treatment Note  [facility]     Patient Name: Vidhya Ramirez  : 1940  MRN: [id number]  Today's Date: 10/10/2018             Admit Date: 10/8/2018    Visit Dx:    ICD-10-CM ICD-9-CM   1. Primary osteoarthritis of left knee M17.12 715.16     Patient Active Problem List   Diagnosis   • DJD (degenerative joint disease) of knee       Therapy Treatment          Rehabilitation Treatment Summary     Row Name 10/10/18 0900             Treatment Time/Intention    Discipline physical therapy assistant  -CW      Document Type therapy note (daily note)  -CW      Subjective Information complains of;pain  -CW      Mode of Treatment physical therapy  -CW      Therapy Frequency (PT Clinical Impression) 2 times/day  -CW      Patient Effort good  -CW      Existing Precautions/Restrictions fall  -CW      Recorded by [CW] Simon Villegas, PTA 10/10/18 0957      Row Name 10/10/18 0900             Vital Signs    O2 Delivery Pre Treatment room air  -CW      Recorded by [CW] Simon Villegas PTA 10/10/18 0957      Row Name 10/10/18 0900             Cognitive Assessment/Intervention- PT/OT    Orientation Status (Cognition) oriented x 4  -CW      Follows Commands (Cognition) WNL  -CW      Personal Safety Interventions fall prevention program maintained;gait belt;muscle strengthening facilitated;nonskid shoes/slippers when out of bed  -CW      Recorded by [CW] Simon Villegas P, PTA 10/10/18 0957      Row Name 10/10/18 0900             Bed Mobility Assessment/Treatment    Comment (Bed Mobility) in chair  -CW      Recorded by [CW] Simon Villegas PTA 10/10/18 0957      Row Name 10/10/18 0900             Transfer Assessment/Treatment    Transfer Assessment/Treatment sit-stand transfer;stand-sit transfer  -CW      Recorded by [CW] Simon Villegas PTA 10/10/18 0957      Row Name 10/10/18 0900             Sit-Stand Transfer    Sit-Stand [place] (Transfers) supervision  -CW       Assistive Device (Sit-Stand Transfers) walker, front-wheeled  -CW      Recorded by [CW] Simon Villegas, PTA 10/10/18 0957      Row Name 10/10/18 0900             Stand-Sit Transfer    Stand-Sit [place] (Transfers) supervision  -CW      Assistive Device (Stand-Sit Transfers) walker, front-wheeled  -CW      Recorded by [CW] Simon Villegas, PTA 10/10/18 0957      Row Name 10/10/18 0900             Gait/Stairs Assessment/Training    [place] Level (Gait) supervision  -CW      Assistive Device (Gait) walker, front-wheeled  -CW      Distance in Feet (Gait) 40  -CW      Pattern (Gait) step-to  -CW      Deviations/Abnormal Patterns (Gait) antalgic;gait speed decreased;stride length decreased  -CW      Recorded by [CW] Simon Villegas, PTA 10/10/18 0957      Row Name 10/10/18 0900             Therapeutic Exercise    Comment (Therapeutic Exercise) L TKA Protocol 25 reps  -CW      Recorded by [CW] Simon Villegas, PTA 10/10/18 0957      Row Name 10/10/18 0900             Positioning and Restraints    Pre-Treatment Position sitting in chair/recliner  -CW      Post Treatment Position chair  -CW      In Chair notified nsg;reclined;call light within reach;encouraged to call for assist;with family/caregiver  -CW      Recorded by [CW] Simon Villegas, PTA 10/10/18 0957      Row Name 10/10/18 0900             Pain Assessment    Additional Documentation Pain Scale: Numbers Pre/Post-Treatment (Group)  -CW      Recorded by [CW] Simon Villegas, PTA 10/10/18 0957      Row Name 10/10/18 0900             Pain Scale: Numbers Pre/Post-Treatment    Pain Scale: Numbers, Pretreatment 8/10  -CW      Pain Scale: Numbers, Post-Treatment 8/10  -CW      Pain Location - Side Left  -CW      Pain Location knee  -CW      Pain Intervention(s) Repositioned;Ambulation/increased activity  -CW      Recorded by [CW] Simon Villegas, PTA 10/10/18 0957      Row Name                Wound 10/08/18 0732 Left knee incision     Wound - Properties Group Date first assessed: 10/08/18 [MB] Time first assessed: 0732 [MB] Side: Left [MB] Location: knee [MB] Type: incision [MB] Recorded by:  [MB] Priscila Bethea RN 10/08/18 0732    Row Name 10/10/18 0900             Outcome Summary/Treatment Plan (PT)    Anticipated Discharge Disposition (PT) home with home health  -CW      Recorded by [CW] Simon Villegas, PTA 10/10/18 0957        User Key  (r) = Recorded By, (t) = Taken By, (c) = Cosigned By    Initials Name Effective Dates Discipline    Priscila Hastings RN 06/16/16 -  Nurse    CW Simon Villegas, PTA 03/07/18 -  PT          Wound 10/08/18 0732 Left knee incision (Active)   Dressing Appearance no drainage 10/10/2018  7:45 AM   Closure KEYLA 10/10/2018  7:45 AM   Base moist 10/9/2018  4:32 PM   Drainage Characteristics/Odor serosanguineous 10/10/2018  5:43 AM   Drainage Amount moderate 10/10/2018  5:43 AM   Dressing Care, Wound dressing changed;dressing applied 10/10/2018  5:43 AM             Physical Therapy Education     Title: PT OT SLP Therapies (Resolved)     Topic: Physical Therapy (Resolved)     Point: Mobility training (Resolved)    Learning Progress Summary     Learner Status Readiness Method Response Comment Documented by    Patient Done Acceptance EVETTE LEDEZMA NR KH 10/08/18 1323          Point: Home exercise program (Resolved)    Learning Progress Summary     Learner Status Readiness Method Response Comment Documented by    Patient Done Acceptance EVETTE LEDEZMA NR KH 10/08/18 1323          Point: Body mechanics (Resolved)    Learning Progress Summary     Learner Status Readiness Method Response Comment Documented by    Patient Done Acceptance EVETTE LEDEZMA NR KH 10/08/18 1323          Point: Precautions (Resolved)    Learning Progress Summary     Learner Status Readiness Method Response Comment Documented by    Patient Done Acceptance EVETTE LEDEZMA NR KH 10/08/18 1323                      User Key     Initials Effective Dates Name Provider Type  Discipline     06/08/18 -  Carmela Dill, PT Physical Therapist PT                    PT Recommendation and Plan  Anticipated Discharge Disposition (PT): home with home health  Therapy Frequency (PT Clinical Impression): 2 times/day  Outcome Summary/Treatment Plan (PT)  Anticipated Discharge Disposition (PT): home with home health  Plan of Care Reviewed With: patient  Progress: improving  Outcome Summary: Pt increasing with strength and ROM to improve with transfers and amb safety with RWX          Outcome Measures     Row Name 10/10/18 1000 10/09/18 1000 10/08/18 1300       How much help from another person do you currently need...    Turning from your back to your side while in flat bed without using bedrails? 3  -CW 3  -CW 3  -KH    Moving from lying on back to sitting on the side of a flat bed without bedrails? 3  -CW 3  -CW 3  -KH    Moving to and from a bed to a chair (including a wheelchair)? 3  -CW 3  -CW 3  -KH    Standing up from a chair using your arms (e.g., wheelchair, bedside chair)? 3  -CW 3  -CW 3  -KH    Climbing 3-5 steps with a railing? 2  -CW 2  -CW 2  -KH    To walk in hospital room? 3  -CW 3  -CW 2  -KH    AM-PAC 6 Clicks Score 17  -CW 17  -CW 16  -KH       Functional Assessment    Outcome Measure Options AM-PAC 6 Clicks Basic Mobility (PT)  -CW AM-PAC 6 Clicks Basic Mobility (PT)  -CW AM-PAC 6 Clicks Basic Mobility (PT)  -KH      User Key  (r) = Recorded By, (t) = Taken By, (c) = Cosigned By    Initials Name Provider Type    Carmela Hwang, PT Physical Therapist    CW Simon Villegas, PTA Physical Therapy Assistant           Time Calculation:         PT Charges     Row Name 10/10/18 1017             Time Calculation    Start Time 0930  -CW      Stop Time 1017  -CW      Time Calculation (min) 47 min  -CW      PT Received On 10/10/18  -CW      PT - Next Appointment 10/10/18  -CW        User Key  (r) = Recorded By, (t) = Taken By, (c) = Cosigned By    Initials Name  Provider Type    CW Simon Villegas PTA Physical Therapy Assistant        Therapy Suggested Charges     Code   Minutes Charges    None           Therapy Charges for Today     Code Description Service Date Service Provider Modifiers Qty    20387595670 HC PT THER PROC EA 15 MIN 10/9/2018 Simon Villegas, PTA GP 1    87628490569 HC PT THER PROC GROUP 10/9/2018 Simon Villegas, PTA GP 1    04822345820 HC PT THER PROC GROUP 10/9/2018 Simon Villegas, PTA GP 1    98008915273 HC PT THER PROC EA 15 MIN 10/9/2018 Simon Villegas, PTA GP 1    82817398825 HC PT THER PROC GROUP 10/10/2018 Simon Villegas, PTA GP 1    89976843547 HC PT THER PROC EA 15 MIN 10/10/2018 Simon Villegas, PTA GP 1          PT G-Codes  Outcome Measure Options: AM-PAC 6 Clicks Basic Mobility (PT)  AM-PAC 6 Clicks Score: 17    Simon Villegas PTA  10/10/2018

## 2018-10-10 NOTE — THERAPY TREATMENT NOTE
Acute Care - Physical Therapy Treatment Note  [facility]     Patient Name: Vidhya Ramirez  : 1940  MRN: [id number]  Today's Date: 10/10/2018             Admit Date: 10/8/2018    Visit Dx:    ICD-10-CM ICD-9-CM   1. Primary osteoarthritis of left knee M17.12 715.16     Patient Active Problem List   Diagnosis   • DJD (degenerative joint disease) of knee       Therapy Treatment          Rehabilitation Treatment Summary     Row Name 10/10/18 1400 10/10/18 09          Treatment Time/Intention    Discipline physical therapy assistant  -CW physical therapy assistant  -CW     Document Type therapy note (daily note)  -CW therapy note (daily note)  -CW     Subjective Information complains of;pain  -CW complains of;pain  -CW     Mode of Treatment physical therapy  -CW physical therapy  -CW     Therapy Frequency (PT Clinical Impression) 2 times/day  -CW 2 times/day  -CW     Patient Effort good  -CW good  -CW     Existing Precautions/Restrictions fall  -CW fall  -CW     Recorded by [CW] Simon Villegas P, PTA 10/10/18 140 [CW] Simon Villegas P, PTA 10/10/18 0957     Row Name 10/10/18 1400 10/10/18 09          Vital Signs    O2 Delivery Pre Treatment room air  -CW room air  -CW     Recorded by [CW] Simon Villegas P, PTA 10/10/18 140 [CW] Simon Villegas P, PTA 10/10/18 0957     Row Name 10/10/18 1400 10/10/18 09          Cognitive Assessment/Intervention- PT/OT    Orientation Status (Cognition) oriented x 4  -CW oriented x 4  -CW     Follows Commands (Cognition) WNL  -CW WNL  -CW     Personal Safety Interventions fall prevention program maintained;gait belt;muscle strengthening facilitated;nonskid shoes/slippers when out of bed  -CW fall prevention program maintained;gait belt;muscle strengthening facilitated;nonskid shoes/slippers when out of bed  -CW     Recorded by [CW] Simon Villegas P, PTA 10/10/18 140 [CW] Simon Villegas P, PTA 10/10/18 0957     Row Name 10/10/18 1400 10/10/18 0900           Bed Mobility Assessment/Treatment    Comment (Bed Mobility) in chair  -CW in chair  -CW     Recorded by [CW] Simon Villegas P, PTA 10/10/18 1408 [CW] Simon Villegas P, PTA 10/10/18 0957     Row Name 10/10/18 1400 10/10/18 0900          Transfer Assessment/Treatment    Transfer Assessment/Treatment sit-stand transfer;stand-sit transfer  -CW sit-stand transfer;stand-sit transfer  -CW     Recorded by [CW] Simon Villegas P, PTA 10/10/18 1408 [CW] Simon Villegas, PTA 10/10/18 0957     Row Name 10/10/18 1400 10/10/18 0900          Sit-Stand Transfer    Sit-Stand [place] (Transfers) supervision  -CW supervision  -CW     Assistive Device (Sit-Stand Transfers) walker, front-wheeled  -CW walker, front-wheeled  -CW     Recorded by [CW] Simon Villegas, PTA 10/10/18 1408 [CW] Simon Villegas, PTA 10/10/18 0957     Row Name 10/10/18 1400 10/10/18 0900          Stand-Sit Transfer    Stand-Sit [place] (Transfers) supervision  -CW supervision  -CW     Assistive Device (Stand-Sit Transfers) walker, front-wheeled  -CW walker, front-wheeled  -CW     Recorded by [CW] Simon Villegas, PTA 10/10/18 1408 [CW] Simon Villegas, PTA 10/10/18 0957     Row Name 10/10/18 1400 10/10/18 0900          Gait/Stairs Assessment/Training    [place] Level (Gait) supervision  -CW supervision  -CW     Assistive Device (Gait) walker, front-wheeled  -CW walker, front-wheeled  -CW     Distance in Feet (Gait) 40  -CW 40  -CW     Pattern (Gait) step-to  -CW step-to  -CW     Deviations/Abnormal Patterns (Gait) antalgic;gait speed decreased;stride length decreased  -CW antalgic;gait speed decreased;stride length decreased  -CW     Recorded by [CW] Simon Villegas, PTA 10/10/18 1408 [CW] Simon Villegas P, PTA 10/10/18 0957     Row Name 10/10/18 0900             General ROM    GENERAL ROM COMMENTS 5-80  -CW      Recorded by [CW] Simon Villegas, PTA 10/10/18 1021      Row Name 10/10/18 1400 10/10/18 0408           Therapeutic Exercise    Comment (Therapeutic Exercise) L TKA Protocol 30 reps  -CW L TKA Protocol 25 reps  -CW     Recorded by [CW] Simon Villegas P, PTA 10/10/18 1408 [CW] Simon Villegas P, PTA 10/10/18 0957     Row Name 10/10/18 1400 10/10/18 0900          Positioning and Restraints    Pre-Treatment Position sitting in chair/recliner  -CW sitting in chair/recliner  -CW     Post Treatment Position chair  -CW chair  -CW     In Chair notified nsg;reclined;call light within reach;encouraged to call for assist  -CW notified nsg;reclined;call light within reach;encouraged to call for assist;with family/caregiver  -CW     Recorded by [CW] Simon Villegas P, PTA 10/10/18 1408 [CW] Simon Villegas P, PTA 10/10/18 0957     Row Name 10/10/18 1400 10/10/18 0900          Pain Assessment    Additional Documentation Pain Scale: Numbers Pre/Post-Treatment (Group)  -CW Pain Scale: Numbers Pre/Post-Treatment (Group)  -CW     Recorded by [CW] Simon Villegas P, PTA 10/10/18 1408 [CW] Simon Villegas P, PTA 10/10/18 0957     Row Name 10/10/18 1400 10/10/18 0900          Pain Scale: Numbers Pre/Post-Treatment    Pain Scale: Numbers, Pretreatment 9/10  -CW 8/10  -CW     Pain Scale: Numbers, Post-Treatment 9/10  -CW 8/10  -CW     Pain Location - Side Left  -CW Left  -CW     Pain Location knee  -CW knee  -CW     Pain Intervention(s) Repositioned;Ambulation/increased activity  -CW Repositioned;Ambulation/increased activity  -CW     Recorded by [CW] Simon Villegas P, PTA 10/10/18 1408 [CW] Simon Villegas, PTA 10/10/18 0957     Row Name                Wound 10/08/18 0732 Left knee incision    Wound - Properties Group Date first assessed: 10/08/18 [MB] Time first assessed: 0732 [MB] Side: Left [MB] Location: knee [MB] Type: incision [MB] Recorded by:  [MB] Priscila Bethea RN 10/08/18 0732    Row Name 10/10/18 1400 10/10/18 0900          Outcome Summary/Treatment Plan (PT)    Anticipated Discharge Disposition  (PT) home with home health  -CW home with home health  -CW     Recorded by [CW] Simon Villegas, PTA 10/10/18 1408 [CW] Simon Villegas, PTA 10/10/18 0957       User Key  (r) = Recorded By, (t) = Taken By, (c) = Cosigned By    Initials Name Effective Dates Discipline    Priscila Hastings, RN 06/16/16 -  Nurse    CW Simon Villegas, PTA 03/07/18 -  PT          Wound 10/08/18 0732 Left knee incision (Active)   Dressing Appearance no drainage 10/10/2018  7:45 AM   Closure KEYLA 10/10/2018  7:45 AM   Base moist 10/9/2018  4:32 PM   Drainage Characteristics/Odor serosanguineous 10/10/2018  5:43 AM   Drainage Amount moderate 10/10/2018  5:43 AM   Dressing Care, Wound dressing changed;dressing applied 10/10/2018  5:43 AM             Physical Therapy Education     Title: PT OT SLP Therapies (Resolved)     Topic: Physical Therapy (Resolved)     Point: Mobility training (Resolved)    Learning Progress Summary     Learner Status Readiness Method Response Comment Documented by    Patient Done Acceptance EVETTE LEDEZMA NR   10/08/18 1323          Point: Home exercise program (Resolved)    Learning Progress Summary     Learner Status Readiness Method Response Comment Documented by    Patient Done Acceptance EVETTE LEDEZMA NR   10/08/18 1323          Point: Body mechanics (Resolved)    Learning Progress Summary     Learner Status Readiness Method Response Comment Documented by    Patient Done Acceptance EVETTE LEDEZMA NR   10/08/18 1323          Point: Precautions (Resolved)    Learning Progress Summary     Learner Status Readiness Method Response Comment Documented by    Patient Done Acceptance EVETTE LEDEZMA NR   10/08/18 1323                      User Key     Initials Effective Dates Name Provider Type Discipline     06/08/18 -  Carmela Dill, PT Physical Therapist PT                    PT Recommendation and Plan  Anticipated Discharge Disposition (PT): home with home health  Therapy Frequency (PT Clinical Impression): 2  times/day  Outcome Summary/Treatment Plan (PT)  Anticipated Discharge Disposition (PT): home with home health  Plan of Care Reviewed With: patient  Progress: improving  Outcome Summary: Pt increasing with strength and ROM to improve with transfers and amb safety with RWX          Outcome Measures     Row Name 10/10/18 1000 10/09/18 1000 10/08/18 1300       How much help from another person do you currently need...    Turning from your back to your side while in flat bed without using bedrails? 3  -CW 3  -CW 3  -KH    Moving from lying on back to sitting on the side of a flat bed without bedrails? 3  -CW 3  -CW 3  -KH    Moving to and from a bed to a chair (including a wheelchair)? 3  -CW 3  -CW 3  -KH    Standing up from a chair using your arms (e.g., wheelchair, bedside chair)? 3  -CW 3  -CW 3  -KH    Climbing 3-5 steps with a railing? 2  -CW 2  -CW 2  -KH    To walk in hospital room? 3  -CW 3  -CW 2  -KH    AM-PAC 6 Clicks Score 17  -CW 17  -CW 16  -KH       Functional Assessment    Outcome Measure Options AM-PAC 6 Clicks Basic Mobility (PT)  -CW AM-PAC 6 Clicks Basic Mobility (PT)  -CW AM-PAC 6 Clicks Basic Mobility (PT)  -KH      User Key  (r) = Recorded By, (t) = Taken By, (c) = Cosigned By    Initials Name Provider Type    Carmela Hwang, PT Physical Therapist    CW Simon Villegas, JHOAN Physical Therapy Assistant           Time Calculation:         PT Charges     Row Name 10/10/18 1504 10/10/18 1017          Time Calculation    Start Time 1405  -CW 0930  -CW     Stop Time 1441  -CW 1017  -CW     Time Calculation (min) 36 min  -CW 47 min  -CW     PT Received On 10/10/18  -CW 10/10/18  -CW     PT - Next Appointment 10/11/18  -CW 10/10/18  -CW       User Key  (r) = Recorded By, (t) = Taken By, (c) = Cosigned By    Initials Name Provider Type    Simon Colindres, PTA Physical Therapy Assistant        Therapy Suggested Charges     Code   Minutes Charges    None           Therapy Charges for  Today     Code Description Service Date Service Provider Modifiers Qty    52322729501 HC PT THER PROC EA 15 MIN 10/9/2018 Simon Villegas, PTA GP 1    00157767090 HC PT THER PROC GROUP 10/9/2018 Simon Villegas, PTA GP 1    37585754938 HC PT THER PROC GROUP 10/9/2018 Simon Villegas, PTA GP 1    77759005018 HC PT THER PROC EA 15 MIN 10/9/2018 Simon Villegas, PTA GP 1    12061439790 HC PT THER PROC GROUP 10/10/2018 Simon Villegas, PTA GP 1    06732135038 HC PT THER PROC EA 15 MIN 10/10/2018 Simon Villegas, PTA GP 1    93097255690 HC PT THER PROC GROUP 10/10/2018 Simon Villegas, PTA GP 1    25748186900 HC PT THER PROC EA 15 MIN 10/10/2018 Simon Villegas, PTA GP 1          PT G-Codes  Outcome Measure Options: AM-PAC 6 Clicks Basic Mobility (PT)  AM-PAC 6 Clicks Score: 17    Simon Villegas PTA  10/10/2018

## 2018-10-10 NOTE — PLAN OF CARE
Problem: Patient Care Overview  Goal: Plan of Care Review  Outcome: Ongoing (interventions implemented as appropriate)   10/10/18 4598   OTHER   Outcome Summary vss, dsg changed x1, neurovascular status wnl, voiding well, reports adequate pain control, gait very unsteady, educated on importance of monitoring b/p due to hx hypertension, discharge is planned for thursday, will continue to monitori   Coping/Psychosocial   Plan of Care Reviewed With patient   Plan of Care Review   Progress improving     Goal: Individualization and Mutuality  Outcome: Ongoing (interventions implemented as appropriate)    Goal: Discharge Needs Assessment  Outcome: Ongoing (interventions implemented as appropriate)    Goal: Interprofessional Rounds/Family Conf  Outcome: Ongoing (interventions implemented as appropriate)      Problem: Fall Risk (Adult)  Goal: Absence of Fall  Outcome: Ongoing (interventions implemented as appropriate)      Problem: Knee Arthroplasty (Total, Partial) (Adult)  Goal: Anesthesia/Sedation Recovery  Outcome: Ongoing (interventions implemented as appropriate)

## 2018-10-10 NOTE — PLAN OF CARE
Problem: Patient Care Overview  Goal: Plan of Care Review  Outcome: Ongoing (interventions implemented as appropriate)   10/10/18 1815   OTHER   Outcome Summary Postop day 2 LTKA. Ace wrap dressing clean and dry, no drainage noted this shift. Ambulating with walker and assist. Increased pain this shift, controlled with PO analgesic. BP WNL, continue to monitor. Plan DC home with home health tomorrow.    Coping/Psychosocial   Plan of Care Reviewed With patient   Plan of Care Review   Progress improving     Goal: Individualization and Mutuality  Outcome: Ongoing (interventions implemented as appropriate)    Goal: Discharge Needs Assessment  Outcome: Ongoing (interventions implemented as appropriate)    Goal: Interprofessional Rounds/Family Conf  Outcome: Ongoing (interventions implemented as appropriate)      Problem: Fall Risk (Adult)  Goal: Absence of Fall  Outcome: Ongoing (interventions implemented as appropriate)   10/10/18 1815   Fall Risk (Adult)   Absence of Fall achieves outcome       Problem: Knee Arthroplasty (Total, Partial) (Adult)  Goal: Anesthesia/Sedation Recovery  Outcome: Ongoing (interventions implemented as appropriate)   10/10/18 1815   Goal/Outcome Evaluation   Anesthesia/Sedation Recovery progressing toward baseline

## 2018-10-11 VITALS
WEIGHT: 174.1 LBS | HEART RATE: 72 BPM | DIASTOLIC BLOOD PRESSURE: 64 MMHG | TEMPERATURE: 99.6 F | RESPIRATION RATE: 16 BRPM | BODY MASS INDEX: 29.01 KG/M2 | OXYGEN SATURATION: 93 % | SYSTOLIC BLOOD PRESSURE: 122 MMHG | HEIGHT: 65 IN

## 2018-10-11 PROCEDURE — 90732 PPSV23 VACC 2 YRS+ SUBQ/IM: CPT | Performed by: ORTHOPAEDIC SURGERY

## 2018-10-11 PROCEDURE — G0009 ADMIN PNEUMOCOCCAL VACCINE: HCPCS | Performed by: ORTHOPAEDIC SURGERY

## 2018-10-11 PROCEDURE — 97150 GROUP THERAPEUTIC PROCEDURES: CPT

## 2018-10-11 PROCEDURE — 25010000002 PNEUMOCOCCAL VAC POLYVALENT PER 0.5 ML: Performed by: ORTHOPAEDIC SURGERY

## 2018-10-11 PROCEDURE — 97110 THERAPEUTIC EXERCISES: CPT

## 2018-10-11 RX ORDER — HYDROCODONE BITARTRATE AND ACETAMINOPHEN 7.5; 325 MG/1; MG/1
1 TABLET ORAL EVERY 4 HOURS PRN
Qty: 60 TABLET | Refills: 0 | Status: SHIPPED | OUTPATIENT
Start: 2018-10-11 | End: 2018-10-19

## 2018-10-11 RX ORDER — ASPIRIN 81 MG/1
81 TABLET ORAL DAILY
Status: DISCONTINUED | OUTPATIENT
Start: 2018-10-11 | End: 2018-10-11 | Stop reason: HOSPADM

## 2018-10-11 RX ADMIN — FAMOTIDINE 40 MG: 40 TABLET, FILM COATED ORAL at 08:38

## 2018-10-11 RX ADMIN — LISINOPRIL 10 MG: 10 TABLET ORAL at 08:38

## 2018-10-11 RX ADMIN — HYDROCODONE BITARTRATE AND ACETAMINOPHEN 1 TABLET: 7.5; 325 TABLET ORAL at 12:39

## 2018-10-11 RX ADMIN — PNEUMOCOCCAL VACCINE POLYVALENT 0.5 ML
25; 25; 25; 25; 25; 25; 25; 25; 25; 25; 25; 25; 25; 25; 25; 25; 25; 25; 25; 25; 25; 25; 25 INJECTION, SOLUTION INTRAMUSCULAR; SUBCUTANEOUS at 10:50

## 2018-10-11 RX ADMIN — FERROUS SULFATE TAB 325 MG (65 MG ELEMENTAL FE) 325 MG: 325 (65 FE) TAB at 08:38

## 2018-10-11 RX ADMIN — HYDROCODONE BITARTRATE AND ACETAMINOPHEN 1 TABLET: 7.5; 325 TABLET ORAL at 04:29

## 2018-10-11 RX ADMIN — LEVOTHYROXINE SODIUM 50 MCG: 50 TABLET ORAL at 08:38

## 2018-10-11 RX ADMIN — HYDROCODONE BITARTRATE AND ACETAMINOPHEN 1 TABLET: 7.5; 325 TABLET ORAL at 08:35

## 2018-10-11 NOTE — PLAN OF CARE
Problem: Patient Care Overview  Goal: Plan of Care Review  Outcome: Ongoing (interventions implemented as appropriate)   10/11/18 3551   OTHER   Outcome Summary po pain meds effective, ambulating well with one assist. VSS, voiding, anticipating home today, educated on importance of monitoring blood pressure before taking meds.   Coping/Psychosocial   Plan of Care Reviewed With patient     Goal: Individualization and Mutuality  Outcome: Ongoing (interventions implemented as appropriate)    Goal: Discharge Needs Assessment  Outcome: Ongoing (interventions implemented as appropriate)      Problem: Fall Risk (Adult)  Goal: Absence of Fall  Outcome: Ongoing (interventions implemented as appropriate)

## 2018-10-11 NOTE — THERAPY TREATMENT NOTE
Acute Care - Physical Therapy Treatment Note  [facility]     Patient Name: Vidhya Ramirez  : 1940  MRN: [id number]  Today's Date: 10/11/2018             Admit Date: 10/8/2018    Visit Dx:    ICD-10-CM ICD-9-CM   1. Primary osteoarthritis of left knee M17.12 715.16     Patient Active Problem List   Diagnosis   • DJD (degenerative joint disease) of knee       Therapy Treatment          Rehabilitation Treatment Summary     Row Name 10/11/18 1000             Treatment Time/Intention    Discipline physical therapy assistant  -CW      Document Type therapy note (daily note)  -CW      Subjective Information complains of;pain  -CW      Mode of Treatment physical therapy  -CW      Therapy Frequency (PT Clinical Impression) 2 times/day  -CW      Patient Effort good  -CW      Existing Precautions/Restrictions fall  -CW      Recorded by [CW] Simon Villegas, PTA 10/11/18 1055      Row Name 10/11/18 1000             Vital Signs    O2 Delivery Pre Treatment room air  -CW      Recorded by [CW] Simon Villegas PTA 10/11/18 1055      Row Name 10/11/18 1000             Cognitive Assessment/Intervention- PT/OT    Orientation Status (Cognition) oriented x 4  -CW      Follows Commands (Cognition) WNL  -CW      Personal Safety Interventions fall prevention program maintained;gait belt;muscle strengthening facilitated;nonskid shoes/slippers when out of bed  -CW      Recorded by [CW] Simon Villegas, PTA 10/11/18 1055      Row Name 10/11/18 1000             Bed Mobility Assessment/Treatment    Comment (Bed Mobility) in chair  -CW      Recorded by [CW] Simon Villegas PTA 10/11/18 1055      Row Name 10/11/18 1000             Transfer Assessment/Treatment    Transfer Assessment/Treatment sit-stand transfer;stand-sit transfer  -CW      Recorded by [CW] Simon Villegas, PTA 10/11/18 1055      Row Name 10/11/18 1000             Sit-Stand Transfer    Sit-Stand [place] (Transfers) supervision  -CW       Assistive Device (Sit-Stand Transfers) walker, front-wheeled  -CW      Recorded by [CW] Simon Villegas, PTA 10/11/18 1055      Row Name 10/11/18 1000             Stand-Sit Transfer    Stand-Sit [place] (Transfers) supervision  -CW      Assistive Device (Stand-Sit Transfers) walker, front-wheeled  -CW      Recorded by [CW] Simon Villegas, PTA 10/11/18 1055      Row Name 10/11/18 1000             Gait/Stairs Assessment/Training    [place] Level (Gait) supervision  -CW      Assistive Device (Gait) walker, front-wheeled  -CW      Distance in Feet (Gait) 120  -CW      Pattern (Gait) step-through  -CW      Deviations/Abnormal Patterns (Gait) antalgic;gait speed decreased;stride length decreased  -CW      Recorded by [CW] Simon Villegas, PTA 10/11/18 1055      Row Name 10/11/18 1000             General ROM    GENERAL ROM COMMENTS 7-90  -CW      Recorded by [CW] Simon Villegas, PTA 10/11/18 1055      Row Name 10/11/18 1000             Therapeutic Exercise    Comment (Therapeutic Exercise) L TKA Protocol 30 reps  -CW      Recorded by [CW] Simon Villegas, PTA 10/11/18 1055      Row Name 10/11/18 1000             Positioning and Restraints    Pre-Treatment Position sitting in chair/recliner  -CW      Post Treatment Position chair  -CW      In Chair notified nsg;reclined;call light within reach;encouraged to call for assist  -CW      Recorded by [CW] Simon Villegas, PTA 10/11/18 1055      Row Name 10/11/18 1000             Pain Scale: Numbers Pre/Post-Treatment    Pain Scale: Numbers, Pretreatment 9/10  -CW      Pain Scale: Numbers, Post-Treatment 9/10  -CW      Pain Location - Side Left  -CW      Pain Location knee  -CW      Recorded by [CW] Simon Villegas, PTA 10/11/18 1055      Row Name                Wound 10/08/18 0732 Left knee incision    Wound - Properties Group Date first assessed: 10/08/18 [MB] Time first assessed: 0732 [MB] Side: Left [MB] Location: knee [MB] Type:  incision [MB] Recorded by:  [MB] Priscila Bethea RN 10/08/18 0732    Row Name 10/11/18 1000             Outcome Summary/Treatment Plan (PT)    Anticipated Discharge Disposition (PT) home with home health  -      Recorded by [CW] Simon Villegas, PTA 10/11/18 1055        User Key  (r) = Recorded By, (t) = Taken By, (c) = Cosigned By    Initials Name Effective Dates Discipline    Priscila Hastings RN 06/16/16 -  Nurse    CW Simon Villegas, PTA 03/07/18 -  PT          Wound 10/08/18 0732 Left knee incision (Active)   Dressing Appearance dried drainage 10/11/2018  7:10 AM   Closure KEYLA 10/11/2018  7:10 AM   Drainage Characteristics/Odor serosanguineous 10/11/2018  4:00 AM   Drainage Amount small 10/11/2018  4:00 AM   Dressing Care, Wound gauze;elastic bandage 10/10/2018  8:15 PM             Physical Therapy Education     Title: PT OT SLP Therapies (Resolved)     Topic: Physical Therapy (Resolved)     Point: Mobility training (Resolved)    Learning Progress Summary     Learner Status Readiness Method Response Comment Documented by    Patient Done Acceptance EVETTE LEDEZMA NR   10/08/18 1323          Point: Home exercise program (Resolved)    Learning Progress Summary     Learner Status Readiness Method Response Comment Documented by    Patient Done Acceptance EVETTE LEDEZAM NR   10/08/18 1323          Point: Body mechanics (Resolved)    Learning Progress Summary     Learner Status Readiness Method Response Comment Documented by    Patient Done Acceptance EVETTE LEDEZMA NR   10/08/18 1323          Point: Precautions (Resolved)    Learning Progress Summary     Learner Status Readiness Method Response Comment Documented by    Patient Done Acceptance EVETTE LEDEZMA NR   10/08/18 1323                      User Key     Initials Effective Dates Name Provider Type Discipline     06/08/18 -  Carmela Dill, PT Physical Therapist PT                    PT Recommendation and Plan  Anticipated Discharge Disposition (PT): home with  home health  Therapy Frequency (PT Clinical Impression): 2 times/day  Outcome Summary/Treatment Plan (PT)  Anticipated Discharge Disposition (PT): home with home health  Plan of Care Reviewed With: patient  Progress: improving  Outcome Summary: Pt increasing with strength and balance with use of RWX and increased amb distance and ROM          Outcome Measures     Row Name 10/11/18 1000 10/10/18 1000 10/09/18 1000       How much help from another person do you currently need...    Turning from your back to your side while in flat bed without using bedrails? 3  -CW 3  -CW 3  -CW    Moving from lying on back to sitting on the side of a flat bed without bedrails? 3  -CW 3  -CW 3  -CW    Moving to and from a bed to a chair (including a wheelchair)? 3  -CW 3  -CW 3  -CW    Standing up from a chair using your arms (e.g., wheelchair, bedside chair)? 3  -CW 3  -CW 3  -CW    Climbing 3-5 steps with a railing? 2  -CW 2  -CW 2  -CW    To walk in hospital room? 3  -CW 3  -CW 3  -CW    AM-PAC 6 Clicks Score 17  -CW 17  -CW 17  -CW       Functional Assessment    Outcome Measure Options AM-PAC 6 Clicks Basic Mobility (PT)  -CW AM-PAC 6 Clicks Basic Mobility (PT)  -CW AM-PAC 6 Clicks Basic Mobility (PT)  -CW    Row Name 10/08/18 1300             How much help from another person do you currently need...    Turning from your back to your side while in flat bed without using bedrails? 3  -KH      Moving from lying on back to sitting on the side of a flat bed without bedrails? 3  -KH      Moving to and from a bed to a chair (including a wheelchair)? 3  -KH      Standing up from a chair using your arms (e.g., wheelchair, bedside chair)? 3  -KH      Climbing 3-5 steps with a railing? 2  -KH      To walk in hospital room? 2  -KH      AM-PAC 6 Clicks Score 16  -KH         Functional Assessment    Outcome Measure Options AM-PAC 6 Clicks Basic Mobility (PT)  -KH        User Key  (r) = Recorded By, (t) = Taken By, (c) = Cosigned By     Initials Name Provider Type    VIKTOR Dill Carmela Ann, PT Physical Therapist    CW Simon Villegas, JHOAN Physical Therapy Assistant           Time Calculation:         PT Charges     Row Name 10/11/18 1058             Time Calculation    Start Time 0930  -CW      Stop Time 1018  -CW      Time Calculation (min) 48 min  -CW      PT Received On 10/11/18  -CW        User Key  (r) = Recorded By, (t) = Taken By, (c) = Cosigned By    Initials Name Provider Type    Simon Colindres PTA Physical Therapy Assistant        Therapy Suggested Charges     Code   Minutes Charges    None           Therapy Charges for Today     Code Description Service Date Service Provider Modifiers Qty    00722969258 HC PT THER PROC GROUP 10/10/2018 Simon Villegas, PTA GP 1    98132239419 HC PT THER PROC EA 15 MIN 10/10/2018 Simon Villegas, PTA GP 1    85649333397 HC PT THER PROC GROUP 10/10/2018 Simon Villegas, PTA GP 1    53512531885 HC PT THER PROC EA 15 MIN 10/10/2018 Simon Villegas, PTA GP 1    42039761595 HC PT THER PROC GROUP 10/11/2018 Simon Villegas, PTA GP 1    01933135722 HC PT THER PROC EA 15 MIN 10/11/2018 Simon Villegas, JHOAN GP 1          PT G-Codes  Outcome Measure Options: AM-PAC 6 Clicks Basic Mobility (PT)  AM-PAC 6 Clicks Score: 17    Simon Villegas PTA  10/11/2018

## 2018-10-11 NOTE — DISCHARGE SUMMARY
Discharge Summary   Guido Ramos M.D.    NAME: Vidhya Ramirez ADMIT: 10/8/2018   : 1940  PCP: Elisabeth Gold MD    MRN: [id number] LOS: 3 days   AGE/SEX: 77 y.o. female  ROOM: P881/1       Date of Discharge:  10/11/18    Primary Discharge Diagnosis:  DJD (degenerative joint disease) of knee [M17.10]    Secondary Discharge Diagnosis:    Problem List Items Addressed This Visit     DJD (degenerative joint disease) of knee - Primary    Relevant Orders    Commode Chair    Referral to home health          Procedures Performed:  Left Total Knee Arthroplasty    Hospital Course:    Vidhya Ramirez is a 77 y.o.  female who underwent successful Left Total Knee Arthroplasty on 10/8/2018.  Vidhya Ramirez was started on Aspirin 325mg po daily immediately post-operatively for DVT prophylaxis.  On post-op day 1 the patients dressing was changed, drain removed and their incision was clean, with no signs of infection and their calf was soft, with no signs of DVT.  The patient progressed well with physical therapy and the patients hemoglobin remained stable. On post-operative day 3 the patient was felt ready for discharge.      Total Knee Joint Replacement Discharge Instructions:    I. ACTIVITIES:    1. Exercises:  ? Complete exercise program as taught by the hospital physical therapist 2 times per day  ? Exercise program will be advanced by the physical therapist  ? During the day be up ambulating every 2 hours (while awake) for short distances  ? Complete the ankle pump exercises at least 10 times per hour (while awake)  ? Elevate legs most of the day the first week post operatively and thereafter elevate legs when in bed and for at least 30 minutes during the day. Caution must be taken to avoid pillow placement under the bend of the knee as this can led to flexion contractures of the knee.  ? Use cold packs 20-30 minutes approximately 5 times per day. This should be done before and after completing your exercises  and at any time you are experiencing pain/ stiffness in your operative extremity.    2. Activities of Daily Living:  ? No tub baths, hot tubs, or swimming pools for 4 weeks  ? May shower and let water run over the incision on post-operative day #7 if no drainage. Do not scrub or rub the incision. Simply let the water run over the incision and pat dry.    II. Precautions:  ? Everyone that comes near you should wash their hands  ? No elective dental, genital-urinary, or colon procedures or surgical procedures for 12 weeks after surgery unless absolutely necessary.  ? If dental work or surgical procedure is deemed absolutely necessary during the first 12 weeks, you will need to contact your surgeon as you will need to take antibiotics 1 hour prior to any dental work (including teeth cleanings).  ? Please discuss with your surgeon prophylactic antibiotics as the length of time this intervention will be necessary for you varies with each patient’s health history and situation.  ? Avoid sick people. If you must be around someone who is ill, they should wear a mask.  ? Avoid visits to the Emergency Room or Urgent Care unless you are having a life threatening event.     III. INCISION CARE:  ? Wash your hands prior to dressing changes  ? Change the dressing as needed to keep incision clean and dry. Utilize dry gauze and paper tape. Avoid touching the side of the gauze that goes against the incision with your hands.  ? No creams or ointments to the incision  ? May remove dressing once the incision is free of drainage  ? Do not touch or pick at the incision  ? Check incision every day and notify surgeon immediately if any of the following signs or symptoms are noted:  o Increase in redness  o Increase in swelling around the incision and of the entire extremity  o Increase in pain  o Drainage oozing from the incision  o Pulling apart of the edges of the incision  o Increase in overall body temperature (greater than 100.5  degrees)  ? Your surgeon will instruct you regarding suture or staple removal    IV. Medications:     1. Anticoagulants: You will be discharged on an anticoagulant. This is a prophylactic medication that helps prevent blood clots during your post-operative period. The type and length of dosage varies based on your individual needs, procedure performed, and surgeon’s preference.    ? While taking the anticoagulant, you should avoid taking any additional aspirin, ibuprofen (Advil or Motrin), Aleve (Naprosyn) or other non-steroidal anti-inflammatory medications.   ? Notify surgeon immediately if any vahe bleeding is noted in the urine, stool, emesis, or from the nose or the incision. Blood in the stool will often appear as black rather than red. Blood in urine may appear as pink. Blood in emesis may appear as brown/black like coffee grounds.  ? You will need to apply pressure for longer periods of time to any cuts or abrasions to stop bleeding  ? Avoid alcohol while taking anticoagulants    2. Stool Softeners: You will be at greater risk of constipation after surgery due to being less mobile and the pain medications.     ? Take stool softeners as instructed by your surgeon while on pain medications. Bran cereal is most effective. Over the counter Colace 100 mg 1-2 capsules twice daily.   ? Drink plenty of fluids, and eat fruits and vegetables during your recovery time    3. Pain Medications utilized after surgery are narcotics and the law requires that the following information be given to all patients that are prescribed narcotics:    ? CLASSIFICATION: Pain medications are called Opioids and are narcotics  ? LEGALITIES: It is illegal to share narcotics with others and to drive within 24 hours of taking narcotics  ? POTENTIAL SIDE EFFECTS: Potential side effects of opioids include: nausea, vomiting, itching, dizziness, drowsiness, dry mouth, constipation, and difficulty urinating.  ? POTENTIAL ADVERSE EFFECTS:    o Opioid tolerance can develop with use of pain medications and this simply means that it requires more and more of the medication to control pain; however, this is seen more in patients that use opioids for longer periods of time.  o Opioid dependence can develop with use of Opioids and this simply means that to stop the medication can cause withdrawal symptoms; however, this is seen with patients that use Opioids for longer periods of time.  o Opioid addiction can develop with use of Opioids and the incidence of this is very unlikely in patients who take the medications as ordered and stop the medications as instructed.  o Opioid overdose can be dangerous, but is unlikely when the medication is taken as ordered and stopped when ordered. It is important not to mix opioids with alcohol or with and type of sedative such as Benadryl as this can lead to over sedation and respiratory difficulty.  ? DOSAGE:   o Pain medications will need to be taken consistently for the first week to decrease pain and promote adequate pain relief and participation in physical therapy.  o After the initial surgical pain begins to resolve, you may begin to decrease the pain medication. By the end of 6-8 weeks, you should be off of pain medications.  o Refills will not be given by the office during evening hours, on weekends, or after 6-8 weeks post-op.  o To seek refills on pain medications during the initial 6 week post-operative period, you must call the office 48 hours in advance to request the refill. The office will then notify you when to  the prescription. DO NOT wait until you are out of the medication to request a refill.    V. FOLLOW-UP VISITS:  ? You will need to follow up in the office with your surgeon in 3 weeks. Please call this number [phone] to schedule this appointment.  If you have any concerns or suspected complications prior to your follow up visit, please call your surgeons office. Do not wait until your  appointment time if you suspect complications. These will need to be addressed in the office promptly.    Discharge Medications:     1) Hydrocodone 7.5/325  1 po q 4-6 hours for pain control  2)  Aspirin 81 mg po daily for 6 weeks.      Guido Ramos MD  10/11/2018  6:19 AM

## 2018-10-11 NOTE — PLAN OF CARE
Problem: Patient Care Overview  Goal: Plan of Care Review  Outcome: Ongoing (interventions implemented as appropriate)   10/11/18 1256   OTHER   Outcome Summary Pain well controlled with oral analgesic. Ambulating well with walker. Clean dressing applied. DC home with home health.   Coping/Psychosocial   Plan of Care Reviewed With patient   Plan of Care Review   Progress improving     Goal: Individualization and Mutuality  Outcome: Ongoing (interventions implemented as appropriate)    Goal: Discharge Needs Assessment  Outcome: Ongoing (interventions implemented as appropriate)    Goal: Interprofessional Rounds/Family Conf  Outcome: Ongoing (interventions implemented as appropriate)      Problem: Fall Risk (Adult)  Goal: Absence of Fall  Outcome: Ongoing (interventions implemented as appropriate)   10/11/18 1256   Fall Risk (Adult)   Absence of Fall achieves outcome       Problem: Knee Arthroplasty (Total, Partial) (Adult)  Goal: Anesthesia/Sedation Recovery  Outcome: Ongoing (interventions implemented as appropriate)   10/11/18 1256   Goal/Outcome Evaluation   Anesthesia/Sedation Recovery criteria met for discharge

## 2018-10-11 NOTE — PROGRESS NOTES
"/64 (BP Location: Left arm, Patient Position: Lying)   Pulse 80   Temp 99.1 °F (37.3 °C) (Oral)   Resp 16   Ht 165.1 cm (65\")   Wt 79 kg (174 lb 1.6 oz)   SpO2 94%   BMI 28.97 kg/m²     Lab Results (last 24 hours)     ** No results found for the last 24 hours. **          Imaging Results (last 24 hours)     ** No results found for the last 24 hours. **          Patient Care Team:  Elisabeth Gold MD as PCP - General (Family Medicine)    SUBJECTIVE  Doing well  PHYSICAL EXAM   Wound - she has had some bloody drainage, but wound looks good    DJD (degenerative joint disease) of knee      PLAN / DISPOSITION:   discharge today  Guido Ramos MD  10/11/18  6:13 AM      "

## 2018-10-11 NOTE — DISCHARGE INSTRUCTIONS
Total Knee Joint Replacement Discharge Instructions:     I. ACTIVITIES:     1. Exercises:  · Complete exercise program as taught by the hospital physical therapist 2 times per day  · Exercise program will be advanced by the physical therapist  · During the day be up ambulating every 2 hours (while awake) for short distances  · Complete the ankle pump exercises at least 10 times per hour (while awake)  · Elevate legs most of the day the first week post operatively and thereafter elevate legs when in bed and for at least 30 minutes during the day. Caution must be taken to avoid pillow placement under the bend of the knee as this can led to flexion contractures of the knee.  · Use cold packs 20-30 minutes approximately 5 times per day. This should be done before and after completing your exercises and at any time you are experiencing pain/ stiffness in your operative extremity.     2. Activities of Daily Living:  · No tub baths, hot tubs, or swimming pools for 4 weeks  · May shower and let water run over the incision on post-operative day #7 if no drainage. Do not scrub or rub the incision. Simply let the water run over the incision and pat dry.     II. Precautions:  · Everyone that comes near you should wash their hands  · No elective dental, genital-urinary, or colon procedures or surgical procedures for 12 weeks after surgery unless absolutely necessary.  · If dental work or surgical procedure is deemed absolutely necessary during the first 12 weeks, you will need to contact your surgeon as you will need to take antibiotics 1 hour prior to any dental work (including teeth cleanings).  · Please discuss with your surgeon prophylactic antibiotics as the length of time this intervention will be necessary for you varies with each patient’s health history and situation.  · Avoid sick people. If you must be around someone who is ill, they should wear a mask.  · Avoid visits to the Emergency Room or Urgent Care unless you  are having a life threatening event.      III. INCISION CARE:  · Wash your hands prior to dressing changes  · Change the dressing as needed to keep incision clean and dry. Utilize dry gauze and paper tape. Avoid touching the side of the gauze that goes against the incision with your hands.  · No creams or ointments to the incision  · May remove dressing once the incision is free of drainage  · Do not touch or pick at the incision  · Check incision every day and notify surgeon immediately if any of the following signs or symptoms are noted:  ? Increase in redness  ? Increase in swelling around the incision and of the entire extremity  ? Increase in pain  ? Drainage oozing from the incision  ? Pulling apart of the edges of the incision  ? Increase in overall body temperature (greater than 100.5 degrees)  · Your surgeon will instruct you regarding suture or staple removal     IV. Medications:      1. Anticoagulants: You will be discharged on an anticoagulant. This is a prophylactic medication that helps prevent blood clots during your post-operative period. The type and length of dosage varies based on your individual needs, procedure performed, and surgeon’s preference.     · While taking the anticoagulant, you should avoid taking any additional aspirin, ibuprofen (Advil or Motrin), Aleve (Naprosyn) or other non-steroidal anti-inflammatory medications.   · Notify surgeon immediately if any vahe bleeding is noted in the urine, stool, emesis, or from the nose or the incision. Blood in the stool will often appear as black rather than red. Blood in urine may appear as pink. Blood in emesis may appear as brown/black like coffee grounds.  · You will need to apply pressure for longer periods of time to any cuts or abrasions to stop bleeding  · Avoid alcohol while taking anticoagulants     2. Stool Softeners: You will be at greater risk of constipation after surgery due to being less mobile and the pain medications.       · Take stool softeners as instructed by your surgeon while on pain medications. Bran cereal is most effective. Over the counter Colace 100 mg 1-2 capsules twice daily.   · Drink plenty of fluids, and eat fruits and vegetables during your recovery time     3. Pain Medications utilized after surgery are narcotics and the law requires that the following information be given to all patients that are prescribed narcotics:     · CLASSIFICATION: Pain medications are called Opioids and are narcotics  · LEGALITIES: It is illegal to share narcotics with others and to drive within 24 hours of taking narcotics  · POTENTIAL SIDE EFFECTS: Potential side effects of opioids include: nausea, vomiting, itching, dizziness, drowsiness, dry mouth, constipation, and difficulty urinating.  · POTENTIAL ADVERSE EFFECTS:   ? Opioid tolerance can develop with use of pain medications and this simply means that it requires more and more of the medication to control pain; however, this is seen more in patients that use opioids for longer periods of time.  ? Opioid dependence can develop with use of Opioids and this simply means that to stop the medication can cause withdrawal symptoms; however, this is seen with patients that use Opioids for longer periods of time.  ? Opioid addiction can develop with use of Opioids and the incidence of this is very unlikely in patients who take the medications as ordered and stop the medications as instructed.  ? Opioid overdose can be dangerous, but is unlikely when the medication is taken as ordered and stopped when ordered. It is important not to mix opioids with alcohol or with and type of sedative such as Benadryl as this can lead to over sedation and respiratory difficulty.  · DOSAGE:   ? Pain medications will need to be taken consistently for the first week to decrease pain and promote adequate pain relief and participation in physical therapy.  ? After the initial surgical pain begins to resolve,  you may begin to decrease the pain medication. By the end of 6-8 weeks, you should be off of pain medications.  ? Refills will not be given by the office during evening hours, on weekends, or after 6-8 weeks post-op.  ? To seek refills on pain medications during the initial 6 week post-operative period, you must call the office 48 hours in advance to request the refill. The office will then notify you when to  the prescription. DO NOT wait until you are out of the medication to request a refill.     V. FOLLOW-UP VISITS:  · You will need to follow up in the office with your surgeon in 3 weeks. Please call this number [phone] to schedule this appointment.  If you have any concerns or suspected complications prior to your follow up visit, please call your surgeons office. Do not wait until your appointment time if you suspect complications. These will need to be addressed in the office promptly.

## 2018-10-12 NOTE — PROGRESS NOTES
Case Management Discharge Note    Final Note: Discharged home with [facility] to follow.     Destination     No service has been selected for the patient.      Durable Medical Equipment     No service has been selected for the patient.      Dialysis/Infusion     No service has been selected for the patient.      Home Medical Care - Selection Complete     Service Request Status Selected Specialties Address Phone Number Fax Number    [facility] Health [facility] [address] [address] [phone]      Social Care     No service has been selected for the patient.        Other:  (private auto)    Final Discharge Disposition Code: 06 - home with home health care

## 2018-10-16 ENCOUNTER — NURSE TRIAGE (OUTPATIENT)
Dept: CALL CENTER | Facility: HOSPITAL | Age: 78
End: 2018-10-16

## 2018-10-16 NOTE — TELEPHONE ENCOUNTER
"    Reason for Disposition  • Caller has medication question, adult has minor symptoms, caller declines triage, and triager answers question    Additional Information  • Negative: Drug overdose and nurse unable to answer question  • Negative: Caller requesting information not related to medicine  • Negative: Caller requesting a prescription for Strep throat and has a positive culture result  • Negative: Rash while taking a medication or within 3 days of stopping it  • Negative: Immunization reaction suspected  • Negative: [1] Asthma and [2] having symptoms of asthma (cough, wheezing, etc)  • Negative: MORE THAN A DOUBLE DOSE of a prescription or over-the-counter (OTC) drug  • Negative: [1] DOUBLE DOSE (an extra dose or lesser amount) of over-the-counter (OTC) drug AND [2] any symptoms (e.g., dizziness, nausea, pain, sleepiness)  • Negative: [1] DOUBLE DOSE (an extra dose or lesser amount) of prescription drug AND [2] any symptoms (e.g., dizziness, nausea, pain, sleepiness)  • Negative: Took another person's prescription drug  • Negative: [1] DOUBLE DOSE (an extra dose or lesser amount) of prescription drug AND [2] NO symptoms (Exception: a double dose of antibiotics)  • Negative: Diabetes drug error or overdose (e.g., insulin or extra dose)  • Negative: [1] Request for URGENT new prescription or refill of \"essential\" medication (i.e., likelihood of harm to patient if not taken) AND [2] triager unable to fill per unit policy  • Negative: [1] Prescription not at pharmacy AND [2] was prescribed today by PCP  • Negative: Pharmacy calling with prescription questions and triager unable to answer question  • Negative: Caller has URGENT medication question about med that PCP prescribed and triager unable to answer question  • Negative: Caller has NON-URGENT medication question about med that PCP prescribed and triager unable to answer question  • Negative: Caller requesting a NON-URGENT new prescription or refill and " "triager unable to refill per unit policy  • Negative: Caller has medication question about med not prescribed by PCP and triager unable to answer question (e.g., compatibility with other med, storage)  • Negative: [1] DOUBLE DOSE (an extra dose or lesser amount) of over-the-counter (OTC) drug AND [2] NO symptoms  • Negative: [1] DOUBLE DOSE (an extra dose or lesser amount) of antibiotic drug AND [2] NO symptoms  • Negative: Caller has medication question only, adult not sick, and triager answers question    Answer Assessment - Initial Assessment Questions  1. SYMPTOMS: \"Do you have any symptoms?\"     PT is dizzy and nauseated due to her pain medication  2. SEVERITY: If symptoms are present, ask \"Are they mild, moderate or severe?\"     moderate    Protocols used: MEDICATION QUESTION CALL-ADULT-AH      "

## 2020-01-30 NOTE — PLAN OF CARE
Problem: Patient Care Overview  Goal: Plan of Care Review  Outcome: Ongoing (interventions implemented as appropriate)   10/08/18 9465   OTHER   Outcome Summary Pt s/p LTKA. VSS, NVI, Dressing CDI. Worked with PT. Ambulating well with walker and assist x1. Voiding adequately per BRP. Pain controlled with PO tylenol. Discussed BP monitoring d/t hx of HTN. Plans to D/C home with HH. Will cont to monitor   Coping/Psychosocial   Plan of Care Reviewed With patient   Plan of Care Review   Progress improving     Goal: Discharge Needs Assessment  Outcome: Ongoing (interventions implemented as appropriate)    Goal: Interprofessional Rounds/Family Conf  Outcome: Ongoing (interventions implemented as appropriate)      Problem: Fall Risk (Adult)  Goal: Absence of Fall  Outcome: Ongoing (interventions implemented as appropriate)      Problem: Knee Arthroplasty (Total, Partial) (Adult)  Goal: Anesthesia/Sedation Recovery  Outcome: Ongoing (interventions implemented as appropriate)         Fall with Harm Risk

## 2021-03-02 DIAGNOSIS — Z23 IMMUNIZATION DUE: ICD-10-CM

## 2022-11-10 ENCOUNTER — PRE-ADMISSION TESTING (OUTPATIENT)
Dept: PREADMISSION TESTING | Facility: HOSPITAL | Age: 82
End: 2022-11-10

## 2022-11-10 VITALS
TEMPERATURE: 97.9 F | HEIGHT: 66 IN | WEIGHT: 172 LBS | DIASTOLIC BLOOD PRESSURE: 82 MMHG | RESPIRATION RATE: 16 BRPM | OXYGEN SATURATION: 97 % | SYSTOLIC BLOOD PRESSURE: 146 MMHG | BODY MASS INDEX: 27.64 KG/M2 | HEART RATE: 66 BPM

## 2022-11-10 LAB
ANION GAP SERPL CALCULATED.3IONS-SCNC: 10.2 MMOL/L (ref 5–15)
BUN SERPL-MCNC: 18 MG/DL (ref 8–23)
BUN/CREAT SERPL: 21.4 (ref 7–25)
CALCIUM SPEC-SCNC: 10.1 MG/DL (ref 8.6–10.5)
CHLORIDE SERPL-SCNC: 104 MMOL/L (ref 98–107)
CO2 SERPL-SCNC: 24.8 MMOL/L (ref 22–29)
CREAT SERPL-MCNC: 0.84 MG/DL (ref 0.57–1)
DEPRECATED RDW RBC AUTO: 47 FL (ref 37–54)
EGFRCR SERPLBLD CKD-EPI 2021: 69.9 ML/MIN/1.73
ERYTHROCYTE [DISTWIDTH] IN BLOOD BY AUTOMATED COUNT: 14.5 % (ref 12.3–15.4)
GLUCOSE SERPL-MCNC: 156 MG/DL (ref 65–99)
HCT VFR BLD AUTO: 35.1 % (ref 34–46.6)
HGB BLD-MCNC: 11.1 G/DL (ref 12–15.9)
MCH RBC QN AUTO: 27.9 PG (ref 26.6–33)
MCHC RBC AUTO-ENTMCNC: 31.6 G/DL (ref 31.5–35.7)
MCV RBC AUTO: 88.2 FL (ref 79–97)
PLATELET # BLD AUTO: 229 10*3/MM3 (ref 140–450)
PMV BLD AUTO: 9.8 FL (ref 6–12)
POTASSIUM SERPL-SCNC: 4.4 MMOL/L (ref 3.5–5.2)
QT INTERVAL: 438 MS
RBC # BLD AUTO: 3.98 10*6/MM3 (ref 3.77–5.28)
SODIUM SERPL-SCNC: 139 MMOL/L (ref 136–145)
WBC NRBC COR # BLD: 5.22 10*3/MM3 (ref 3.4–10.8)

## 2022-11-10 PROCEDURE — 93005 ELECTROCARDIOGRAM TRACING: CPT

## 2022-11-10 PROCEDURE — 36415 COLL VENOUS BLD VENIPUNCTURE: CPT

## 2022-11-10 PROCEDURE — 85027 COMPLETE CBC AUTOMATED: CPT

## 2022-11-10 PROCEDURE — 80048 BASIC METABOLIC PNL TOTAL CA: CPT

## 2022-11-10 PROCEDURE — 93010 ELECTROCARDIOGRAM REPORT: CPT | Performed by: INTERNAL MEDICINE

## 2022-11-10 RX ORDER — OMEPRAZOLE 40 MG/1
40 CAPSULE, DELAYED RELEASE ORAL DAILY
COMMUNITY
Start: 2022-09-10

## 2022-11-10 NOTE — DISCHARGE INSTRUCTIONS
Take the following medications the morning of surgery: LEVOTHYROXINE, OMEPRAZOLE    ARRIVE AT 9:00 AM      If you are on prescription narcotic pain medication to control your pain you may also take that medication the morning of surgery.    General Instructions:  Do not eat solid food after midnight the night before surgery.  You may drink clear liquids day of surgery but must stop at least one hour before your hospital arrival time. CUTOFF TIME IS 8:00 AM.  It is beneficial for you to have a clear drink that contains carbohydrates the day of surgery.  We suggest a 12 to 20 ounce bottle of Gatorade or Powerade for non-diabetic patients or a 12 to 20 ounce bottle of G2 or Powerade Zero for diabetic patients. (Pediatric patients, are not advised to drink a 12 to 20 ounce carbohydrate drink)    Clear liquids are liquids you can see through.  Nothing red in color.     Plain water                               Sports drinks  Sodas                                   Gelatin (Jell-O)  Fruit juices without pulp such as white grape juice and apple juice  Popsicles that contain no fruit or yogurt  Tea or coffee (no cream or milk added)  Gatorade / Powerade  G2 / Powerade Zero    Patients who avoid smoking, chewing tobacco and alcohol for 4 weeks prior to surgery have a reduced risk of post-operative complications.  Quit smoking as many days before surgery as you can.  Do not smoke, use chewing tobacco or drink alcohol the day of surgery.   If applicable bring your C-PAP/ BI-PAP machine.  Bring any papers given to you in the doctor’s office.  Wear clean comfortable clothes.  Do not wear contact lenses, false eyelashes or make-up.  Bring a case for your glasses.   Bring crutches or walker if applicable.  Remove all piercings.  Leave jewelry and any other valuables at home.  Hair extensions with metal clips must be removed prior to surgery.  The Pre-Admission Testing nurse will instruct you to bring medications if unable to  obtain an accurate list in Pre-Admission Testing.        Preventing a Surgical Site Infection:  For 2 to 3 days before surgery, avoid shaving with a razor because the razor can irritate skin and make it easier to develop an infection.    Any areas of open skin can increase the risk of a post-operative wound infection by allowing bacteria to enter and travel throughout the body.  Notify your surgeon if you have any skin wounds / rashes even if it is not near the expected surgical site.  The area will need assessed to determine if surgery should be delayed until it is healed.  The night prior to surgery shower using a fresh bar of anti-bacterial soap (such as Dial) and clean washcloth.  Sleep in a clean bed with clean clothing.  Do not allow pets to sleep with you.  Shower on the morning of surgery using a fresh bar of anti-bacterial soap (such as Dial) and clean washcloth.  Dry with a clean towel and dress in clean clothing.  Ask your surgeon if you will be receiving antibiotics prior to surgery.  Make sure you, your family, and all healthcare providers clean their hands with soap and water or an alcohol based hand  before caring for you or your wound.    Day of surgery:  Your arrival time is approximately two hours before your scheduled surgery time.  Upon arrival, a Pre-op nurse and Anesthesiologist will review your health history, obtain vital signs, and answer questions you may have.  The only belongings needed at this time will be a list of your home medications and if applicable your C-PAP/BI-PAP machine.  A Pre-op nurse will start an IV and you may receive medication in preparation for surgery, including something to help you relax.     Please be aware that surgery does come with discomfort.  We want to make every effort to control your discomfort so please discuss any uncontrolled symptoms with your nurse.   Your doctor will most likely have prescribed pain medications.      If you are going home  after surgery you will receive individualized written care instructions before being discharged.  A responsible adult must drive you to and from the hospital on the day of your surgery and stay with you for 24 hours.  Discharge prescriptions can be filled by the hospital pharmacy during regular pharmacy hours.  If you are having surgery late in the day/evening your prescription may be e-prescribed to your pharmacy.  Please verify your pharmacy hours or chose a 24 hour pharmacy to avoid not having access to your prescription because your pharmacy has closed for the day.    If you are staying overnight following surgery, you will be transported to your hospital room following the recovery period.  [facility] has all private rooms.    If you have any questions please call Pre-Admission Testing at [phone].  Deductibles and co-payments are collected on the day of service. Please be prepared to pay the required co-pay, deductible or deposit on the day of service as defined by your plan.    Call your surgeon immediately if you experience any of the following symptoms:  Sore Throat  Shortness of Breath or difficulty breathing  Cough  Chills  Body soreness or muscle pain  Headache  Fever  New loss of taste or smell  Do not arrive for your surgery ill.  Your procedure will need to be rescheduled to another time.  You will need to call your physician before the day of surgery to avoid any unnecessary exposure to hospital staff as well as other patients.

## 2022-11-15 ENCOUNTER — ANESTHESIA (OUTPATIENT)
Dept: PERIOP | Facility: HOSPITAL | Age: 82
End: 2022-11-15

## 2022-11-15 ENCOUNTER — HOSPITAL ENCOUNTER (OUTPATIENT)
Facility: HOSPITAL | Age: 82
Setting detail: HOSPITAL OUTPATIENT SURGERY
Discharge: HOME | End: 2022-11-15
Attending: OPHTHALMOLOGY | Admitting: OPHTHALMOLOGY

## 2022-11-15 ENCOUNTER — ANESTHESIA EVENT (OUTPATIENT)
Dept: PERIOP | Facility: HOSPITAL | Age: 82
End: 2022-11-15

## 2022-11-15 VITALS
HEART RATE: 71 BPM | SYSTOLIC BLOOD PRESSURE: 191 MMHG | TEMPERATURE: 98.9 F | OXYGEN SATURATION: 99 % | RESPIRATION RATE: 18 BRPM | DIASTOLIC BLOOD PRESSURE: 79 MMHG

## 2022-11-15 PROCEDURE — 25010000002 CEFAZOLIN IN DEXTROSE 2-4 GM/100ML-% SOLUTION: Performed by: OPHTHALMOLOGY

## 2022-11-15 PROCEDURE — 25010000002 FENTANYL CITRATE (PF) 50 MCG/ML SOLUTION: Performed by: NURSE ANESTHETIST, CERTIFIED REGISTERED

## 2022-11-15 PROCEDURE — 25010000002 HYDRALAZINE PER 20 MG: Performed by: NURSE ANESTHETIST, CERTIFIED REGISTERED

## 2022-11-15 PROCEDURE — 25010000002 PROPOFOL 10 MG/ML EMULSION: Performed by: NURSE ANESTHETIST, CERTIFIED REGISTERED

## 2022-11-15 RX ORDER — LABETALOL HYDROCHLORIDE 5 MG/ML
5 INJECTION, SOLUTION INTRAVENOUS
Status: DISCONTINUED | OUTPATIENT
Start: 2022-11-15 | End: 2022-11-15 | Stop reason: HOSPADM

## 2022-11-15 RX ORDER — PROMETHAZINE HYDROCHLORIDE 25 MG/1
25 SUPPOSITORY RECTAL ONCE AS NEEDED
Status: DISCONTINUED | OUTPATIENT
Start: 2022-11-15 | End: 2022-11-15 | Stop reason: HOSPADM

## 2022-11-15 RX ORDER — MAGNESIUM HYDROXIDE 1200 MG/15ML
LIQUID ORAL AS NEEDED
Status: DISCONTINUED | OUTPATIENT
Start: 2022-11-15 | End: 2022-11-15 | Stop reason: HOSPADM

## 2022-11-15 RX ORDER — ERYTHROMYCIN 5 MG/G
OINTMENT OPHTHALMIC 2 TIMES DAILY
Qty: 3.5 G | Refills: 0 | Status: SHIPPED | OUTPATIENT
Start: 2022-11-15

## 2022-11-15 RX ORDER — ERYTHROMYCIN 5 MG/G
OINTMENT OPHTHALMIC AS NEEDED
Status: DISCONTINUED | OUTPATIENT
Start: 2022-11-15 | End: 2022-11-15 | Stop reason: HOSPADM

## 2022-11-15 RX ORDER — CEFAZOLIN SODIUM 2 G/100ML
2 INJECTION, SOLUTION INTRAVENOUS ONCE
Status: COMPLETED | OUTPATIENT
Start: 2022-11-15 | End: 2022-11-15

## 2022-11-15 RX ORDER — LIDOCAINE HYDROCHLORIDE 10 MG/ML
0.5 INJECTION, SOLUTION EPIDURAL; INFILTRATION; INTRACAUDAL; PERINEURAL ONCE AS NEEDED
Status: DISCONTINUED | OUTPATIENT
Start: 2022-11-15 | End: 2022-11-15 | Stop reason: HOSPADM

## 2022-11-15 RX ORDER — SODIUM CHLORIDE, SODIUM LACTATE, POTASSIUM CHLORIDE, CALCIUM CHLORIDE 600; 310; 30; 20 MG/100ML; MG/100ML; MG/100ML; MG/100ML
9 INJECTION, SOLUTION INTRAVENOUS CONTINUOUS
Status: DISCONTINUED | OUTPATIENT
Start: 2022-11-15 | End: 2022-11-15 | Stop reason: HOSPADM

## 2022-11-15 RX ORDER — DIPHENHYDRAMINE HYDROCHLORIDE 50 MG/ML
12.5 INJECTION INTRAMUSCULAR; INTRAVENOUS
Status: DISCONTINUED | OUTPATIENT
Start: 2022-11-15 | End: 2022-11-15 | Stop reason: HOSPADM

## 2022-11-15 RX ORDER — HYDROCODONE BITARTRATE AND ACETAMINOPHEN 5; 325 MG/1; MG/1
1 TABLET ORAL ONCE AS NEEDED
Status: COMPLETED | OUTPATIENT
Start: 2022-11-15 | End: 2022-11-15

## 2022-11-15 RX ORDER — MIDAZOLAM HYDROCHLORIDE 1 MG/ML
0.5 INJECTION INTRAMUSCULAR; INTRAVENOUS
Status: DISCONTINUED | OUTPATIENT
Start: 2022-11-15 | End: 2022-11-15 | Stop reason: HOSPADM

## 2022-11-15 RX ORDER — FENTANYL CITRATE 50 UG/ML
25 INJECTION, SOLUTION INTRAMUSCULAR; INTRAVENOUS
Status: DISCONTINUED | OUTPATIENT
Start: 2022-11-15 | End: 2022-11-15 | Stop reason: HOSPADM

## 2022-11-15 RX ORDER — FAMOTIDINE 10 MG/ML
20 INJECTION, SOLUTION INTRAVENOUS ONCE
Status: COMPLETED | OUTPATIENT
Start: 2022-11-15 | End: 2022-11-15

## 2022-11-15 RX ORDER — NALOXONE HCL 0.4 MG/ML
0.2 VIAL (ML) INJECTION AS NEEDED
Status: DISCONTINUED | OUTPATIENT
Start: 2022-11-15 | End: 2022-11-15 | Stop reason: HOSPADM

## 2022-11-15 RX ORDER — HYDRALAZINE HYDROCHLORIDE 20 MG/ML
5 INJECTION INTRAMUSCULAR; INTRAVENOUS
Status: DISCONTINUED | OUTPATIENT
Start: 2022-11-15 | End: 2022-11-15 | Stop reason: HOSPADM

## 2022-11-15 RX ORDER — FLUMAZENIL 0.1 MG/ML
0.2 INJECTION INTRAVENOUS AS NEEDED
Status: DISCONTINUED | OUTPATIENT
Start: 2022-11-15 | End: 2022-11-15 | Stop reason: HOSPADM

## 2022-11-15 RX ORDER — FENTANYL CITRATE 50 UG/ML
50 INJECTION, SOLUTION INTRAMUSCULAR; INTRAVENOUS
Status: DISCONTINUED | OUTPATIENT
Start: 2022-11-15 | End: 2022-11-15 | Stop reason: HOSPADM

## 2022-11-15 RX ORDER — ONDANSETRON 2 MG/ML
4 INJECTION INTRAMUSCULAR; INTRAVENOUS ONCE AS NEEDED
Status: DISCONTINUED | OUTPATIENT
Start: 2022-11-15 | End: 2022-11-15 | Stop reason: HOSPADM

## 2022-11-15 RX ORDER — PROMETHAZINE HYDROCHLORIDE 25 MG/1
25 TABLET ORAL ONCE AS NEEDED
Status: DISCONTINUED | OUTPATIENT
Start: 2022-11-15 | End: 2022-11-15 | Stop reason: HOSPADM

## 2022-11-15 RX ORDER — PROPOFOL 10 MG/ML
VIAL (ML) INTRAVENOUS AS NEEDED
Status: DISCONTINUED | OUTPATIENT
Start: 2022-11-15 | End: 2022-11-15 | Stop reason: SURG

## 2022-11-15 RX ORDER — SODIUM CHLORIDE 0.9 % (FLUSH) 0.9 %
3 SYRINGE (ML) INJECTION EVERY 12 HOURS SCHEDULED
Status: DISCONTINUED | OUTPATIENT
Start: 2022-11-15 | End: 2022-11-15 | Stop reason: HOSPADM

## 2022-11-15 RX ORDER — HYDROCODONE BITARTRATE AND ACETAMINOPHEN 5; 325 MG/1; MG/1
1 TABLET ORAL EVERY 4 HOURS PRN
Qty: 10 TABLET | Refills: 0 | Status: SHIPPED | OUTPATIENT
Start: 2022-11-15

## 2022-11-15 RX ORDER — LIDOCAINE HYDROCHLORIDE 20 MG/ML
INJECTION, SOLUTION EPIDURAL; INFILTRATION; INTRACAUDAL; PERINEURAL AS NEEDED
Status: DISCONTINUED | OUTPATIENT
Start: 2022-11-15 | End: 2022-11-15 | Stop reason: SURG

## 2022-11-15 RX ORDER — SODIUM CHLORIDE 0.9 % (FLUSH) 0.9 %
3-10 SYRINGE (ML) INJECTION AS NEEDED
Status: DISCONTINUED | OUTPATIENT
Start: 2022-11-15 | End: 2022-11-15 | Stop reason: HOSPADM

## 2022-11-15 RX ORDER — DIPHENHYDRAMINE HCL 25 MG
25 CAPSULE ORAL
Status: DISCONTINUED | OUTPATIENT
Start: 2022-11-15 | End: 2022-11-15 | Stop reason: HOSPADM

## 2022-11-15 RX ORDER — EPHEDRINE SULFATE 50 MG/ML
5 INJECTION, SOLUTION INTRAVENOUS ONCE AS NEEDED
Status: DISCONTINUED | OUTPATIENT
Start: 2022-11-15 | End: 2022-11-15 | Stop reason: HOSPADM

## 2022-11-15 RX ADMIN — PROPOFOL 200 MG: 10 INJECTION, EMULSION INTRAVENOUS at 11:47

## 2022-11-15 RX ADMIN — FENTANYL CITRATE 25 MCG: 50 INJECTION, SOLUTION INTRAMUSCULAR; INTRAVENOUS at 14:25

## 2022-11-15 RX ADMIN — LIDOCAINE HYDROCHLORIDE 100 MG: 20 INJECTION, SOLUTION EPIDURAL; INFILTRATION; INTRACAUDAL; PERINEURAL at 11:47

## 2022-11-15 RX ADMIN — CEFAZOLIN SODIUM 2 G: 2 INJECTION, SOLUTION INTRAVENOUS at 11:31

## 2022-11-15 RX ADMIN — HYDRALAZINE HYDROCHLORIDE 5 MG: 20 INJECTION INTRAMUSCULAR; INTRAVENOUS at 14:38

## 2022-11-15 RX ADMIN — HYDROCODONE BITARTRATE AND ACETAMINOPHEN 1 TABLET: 5; 325 TABLET ORAL at 15:09

## 2022-11-15 RX ADMIN — SODIUM CHLORIDE, POTASSIUM CHLORIDE, SODIUM LACTATE AND CALCIUM CHLORIDE 9 ML/HR: 600; 310; 30; 20 INJECTION, SOLUTION INTRAVENOUS at 09:22

## 2022-11-15 RX ADMIN — HYDRALAZINE HYDROCHLORIDE 5 MG: 20 INJECTION INTRAMUSCULAR; INTRAVENOUS at 14:23

## 2022-11-15 RX ADMIN — FENTANYL CITRATE 25 MCG: 50 INJECTION, SOLUTION INTRAMUSCULAR; INTRAVENOUS at 14:38

## 2022-11-15 RX ADMIN — FAMOTIDINE 20 MG: 10 INJECTION INTRAVENOUS at 09:22

## 2022-11-15 NOTE — ANESTHESIA PREPROCEDURE EVALUATION
Anesthesia Evaluation     Patient summary reviewed and Nursing notes reviewed   NPO Solid Status: > 8 hours  NPO Liquid Status: > 8 hours           Airway   Mallampati: II  TM distance: >3 FB  Neck ROM: full  no difficulty expected  Dental - normal exam     Pulmonary - negative pulmonary ROS and normal exam   Cardiovascular - normal exam  Exercise tolerance: good (4-7 METS)    ECG reviewed  Rhythm: regular  Rate: normal    (+) hypertension, hyperlipidemia,       Neuro/Psych- negative ROS  GI/Hepatic/Renal/Endo    (+)  GERD,      Musculoskeletal     Abdominal  - normal exam   Substance History - negative use     OB/GYN          Other   arthritis,                        Anesthesia Plan    ASA 2     MAC     intravenous induction     Anesthetic plan, risks, benefits, and alternatives have been provided, discussed and informed consent has been obtained with: patient.    Plan discussed with CRNA.

## 2022-11-15 NOTE — PERIOPERATIVE NURSING NOTE
"Surgeon, Dr. Eduardo Tillman updated surgical consent to add \"bilateral lower eyelid blepharoplasty\". Patient and nurse initialed consent.   "

## 2022-11-15 NOTE — ANESTHESIA POSTPROCEDURE EVALUATION
Patient: Vidhya Ramirez    Procedure Summary     Date: 11/15/22 Room / Location: [facility] OR  / [facility] MAIN OR    Anesthesia Start: 1137 Anesthesia Stop: 1350    Procedures:       BILATERAL UPPER EYELID BLEPHAROPLASTY (Bilateral: Eye)      BILATERAL LOWER EYELID BLEPHAROPLASTY (Bilateral: Eye) Diagnosis:     Surgeons: Eduardo Tillman MD Provider: Don Goncalves MD    Anesthesia Type: MAC ASA Status: 2          Anesthesia Type: MAC    Vitals  Vitals Value Taken Time   /87 11/15/22 1506   Temp 37.2 °C (98.9 °F) 11/15/22 1347   Pulse 75 11/15/22 1511   Resp 16 11/15/22 1435   SpO2 97 % 11/15/22 1512   Vitals shown include unvalidated device data.        Post Anesthesia Care and Evaluation    Patient location during evaluation: PACU  Patient participation: complete - patient participated  Level of consciousness: awake  Pain management: adequate    Airway patency: patent  Anesthetic complications: No anesthetic complications    Cardiovascular status: acceptable  Respiratory status: acceptable  Hydration status: acceptable    Comments: BP (!) 191/79   Pulse 71   Temp 37.2 °C (98.9 °F) (Oral)   Resp 18   SpO2 99%

## 2022-11-15 NOTE — DISCHARGE INSTRUCTIONS
Most procedures are performed with local anesthesia with intravenous sedation. While post-operative nausea is rare with this type of anesthesia, it is wise to start your diet by drinking clear liquids after surgery (e.g., 7-Up, Gatorade, ginger ale, etc.).  If clear liquids are tolerated well without nausea or vomiting, you may advance towards a regular diet.  Avoid “fatty foods” (eg, milk, pizza, hamburgers, etc.) on the day of surgery or as long as nausea persists.    Many eyelid procedures only require Extra Strength Tylenol for postoperative pain control.  For those patients with more extensive eyelid reconstruction, a prescription for a pain reliever is often given.  Patients may choose to take the prescribed pain reliever OR Extra Strength Tylenol, BUT NOT both together.  Unless specifically instructed, aspirin products such as Andrews, Bufferin, Anacin, Excedrin, etc., should be avoided for at least one week after surgery.  This also includes Advil, Nuprin, Motrin and Ibuprofen.  Any other medications (except blood thinners), which you were taking prior to surgery, should be continued on their regular schedule.  Whenever lying down or sleeping, keep your head elevated on 2 or 3 pillows such that your head is always elevated above the level of your chest.    Apply cold compress to surgical site as much as possible for 2 to3 days following surgery.  A folded washcloth soaked in ice-cold water and wrung out works well for this.  A bag of frozen peas also works well as it is lightweight but molds to the eye.  Do not apply ice directly to the skin.  A small tube of eye ointment should be provided after surgery.  This is to be gently applied to the stitches twice daily.  If the eyes feel irritated, the ointment is safe to use in the eye for lubrication.  This will likely result in blurred vision but will go away once the ointment is stopped.  EXCEPTION:  If a patch is taped over the eye, do NOT apply cold  compresses or ointment.  Leave the patch undisturbed.  A physician will remove the patch at your first post-operative visit.  If your surgery was done on an outpatient basis, you should receive a phone call the day after surgery to check on your progress and to arrange for a post-operative clinic appointment.  The first post-operative visit will be one to two weeks after surgery to check the incisions and remove sutures if necessary.  A second post-operative visit six to eight weeks after surgery will assess the final surgical result.  The following problems should be reported to the office as soon as possible:  Continuous, brisk bleeding.  Please note that some oozing or drainage is common following a surgical procedure.  Do not try to clean dried blood from the surgical site.   This will likely only create more bleeding.  Temperature (fever) over 101?F.  Excessive pain at surgical site not relieved by the pain medication, especially if associated with protrusion of the eyeball.  Sudden loss of vision.  Please note that some blurriness is expected after surgery due to the ointment used around the eyes.  All patients should be able to check their vision even with eyelid swelling.  Double vision      If a problem should arise or you have a question, I can be reached at any time of the day or week by calling [phone].  I hope that your surgery experience with us is a positive one.  Please contact my office with any questions.  Sincerely,  Galo Collado MD, MD Jorge Alberto Hunter MD

## 2022-11-15 NOTE — OP NOTE
OPERATIVE NOTE    Patient Identification:  Name: Vidhya Ramirez  Age: 81 y.o.  Sex: female  :  1940  MRN: [id number]                                               Preoperative diagnosis:   1. Bilateral upper eyelid dermatochalasis  2. Bilateral lower eyelid dermatochalasis  Postoperative diagnosis: same  Procedure:   1. Bilateral upper eyelid blepharoplasty  2. Bilateral lower eyelid blepharoplasty  Surgeon: Dr. Eduardo Tillman who was present and scrubbed throughout all critical portions of the operation  Assistants: Marshall Sawyer MD  Anesthesia: General  EBL: less than 50cc  Specimens:  * No orders in the log *    Description of the procedure: The patient was taken to the operating room and placed on the table in the supine position, where anesthesia was induced. 2% lidocaine with epinephrine and 0.5% marcaine in a 1:1 fashion was injected over the surgical site, and the patient was prepped and draped in the usual manner for orbitofacial surgery.     Corneal protectors were placed in both eyes.    A 15 Bard-Elijah blade incision was made 8 mm from the eyelash margin, across the entire horizontal extent of the left upper eyelid. A second incision was made 10 mm inferior to the junction of the brow and eyelid, and a pinch test was used to ensure the amount of skin excision was appropriate. The intervening tissue was excised with a 15 Bard-Elijah blade and Alhaji scissors. Excessive orbicularis tissue was removed. The orbital septum was opened horizontally, and excessive fatty tissue was also removed. Bleeding was controlled with electrocauterization. The skin was then closed with 5-0 fast absorbing suture in an interrupted and running fashion, with care to incorporate the prestarsal orbicularis over the pupil, nasal and temporal limbi respectively. The lid position and contour were examined and found to be satisfactory.     The exact same procedure was preformed over the contralateral upper lid.      Attention was then turned to the lower eyelids.  A 15 Bard-Elijah blade incision was made at the right lateral canthus. Sharp dissection was carried down to the lateral orbital rim periosteum. The inferior ramus of the lateral canthal tendon was identified and severed with sharp dissection. A dull tip Alhaji scissors were used to incise the conjunctiva and lower lid retractors across the entire horizontal aspect of the lid.  A 5-0 Vicryl stay suture was placed in the posterior cut edge of conjunctiva. A sharp dissection plane was carried out below the orbicularis muscle layer until the entire lower lid was undermined. The orbital septum was opened horizontally, and herniated fatty tissue was removed. The remaining fatty tissue was recontoured to provide the optimal eyelid configuration. Bleeding was controlled with electrocauterization.  The conjunctiva was closed with interrupted 5-0 fast absorbing gut suture.  The lateral canthal tendon was re-suspended with 5-0 vicryl suture anchored to the lateral orbital rim periosteum. The skin was closed with 5-0 fast absorbing suture.      The exact same procedure was performed on the contralateral lower lid.     The corneal protectors were removed and antibiotic ophthalmic ointment was placed over the surgical site.     The patient was then awakened and taken from the operating room in good condition, having tolerated the procedure well. There were no complications, and the estimated blood loss was less than 50 cc.

## 2022-11-15 NOTE — H&P
History & Physical       Patient: Vidhya Ramirez    Date of Admission: 11/15/2022  7:33 AM    YOB: 1940    Medical Record Number: [id number]      Chief Complaints: Bilateral upper and bilateral lower eyelid dermatochalasis      History of Present Illness: 81 y.o. female presents with as above. No new meds/health problems since office visit      Allergies:   Allergies   Allergen Reactions   • Wheat Bran GI Intolerance       10 point review of systems negative, except pertaining to the HPI    Medications:   Home Medications:  No current facility-administered medications on file prior to encounter.     Current Outpatient Medications on File Prior to Encounter   Medication Sig   • atenolol (TENORMIN) 50 MG tablet Take 1 tablet by mouth Every Evening.   • levothyroxine (SYNTHROID, LEVOTHROID) 50 MCG tablet Take 50 mcg by mouth Daily.   • lisinopril (PRINIVIL,ZESTRIL) 10 MG tablet Take 10 mg by mouth Every Morning.   • simvastatin (ZOCOR) 40 MG tablet Take 40 mg by mouth Every Night.   • aspirin 81 MG chewable tablet Chew 1 tablet Daily. HELD FOR SURGERY   • Multiple Vitamin (MULTI VITAMIN PO) Take 0.5 tablets by mouth Every Morning. HOLD PRIOR TO SURG   • Omega-3 Fatty Acids (FISH OIL PO) Take 1,500 each by mouth Daily. HOLD PRIOR TO SURG   • [DISCONTINUED] MELOXICAM PO Take  by mouth. MD INSTRUCTIONS REGARDING HOLDING (Patient not taking: Reported on 11/10/2022)     Current Medications:  Scheduled Meds:ceFAZolin, 2 g, Intravenous, Once  sodium chloride, 3 mL, Intravenous, Q12H      Continuous Infusions:lactated ringers, 9 mL/hr, Last Rate: 9 mL/hr (11/15/22 0922)      PRN Meds:.•  fentanyl  •  lidocaine PF 1%  •  midazolam  •  sodium chloride    Past Medical History:   Diagnosis Date   • Alopecia    • Arthritis    • Borderline diabetes    • Droopy eyelid, bilateral    • GERD (gastroesophageal reflux disease)    • Hyperlipidemia    • Hypertension    • Hypothyroid    • Left knee pain    • UTI (urinary  tract infection)         Past Surgical History:   Procedure Laterality Date   •  SECTION      MULTIPLE   • COLONOSCOPY     • TOTAL KNEE ARTHROPLASTY Left 10/08/2018    Procedure: lt TOTAL KNEE ARTHROPLASTY;  Surgeon: Guido Ramos MD;  Location: [facility] OR;  Service: Orthopedics   • TOTAL KNEE ARTHROPLASTY Right         Social History     Occupational History   • Not on file   Tobacco Use   • Smoking status: Never   • Smokeless tobacco: Never   Vaping Use   • Vaping Use: Never used   Substance and Sexual Activity   • Alcohol use: Yes     Alcohol/week: 3.0 standard drinks     Types: 3 Shots of liquor per week   • Drug use: No   • Sexual activity: Defer      Social History     Social History Narrative   • Not on file        Family History   Problem Relation Age of Onset   • Breast cancer Other    • Heart disease Other    • Malig Hyperthermia Neg Hx            Physical Exam   Constitutional: Alert, cooperative, in no acute distress    Head: Normocephalic.   Eyes:    Bilateral upper and bilateral lower eyelid dermatochalasis  Neck: Normal range of motion.   Cardiovascular: Normal rate.    Pulmonary/Chest: Effort normal.   Neurological: Alert.   Skin: Skin is warm.   Psychiatric: Normal mood and affect.       Assessment/Plan:  The patient voiced understanding of the risks, benefits, and alternative forms of treatment that were discussed and the patient consents to proceed with bilateral upper eyelid blepharoplasty and bilateral lower eyelid blepharoplasty.    Eduardo Tillman MD

## 2025-02-07 ENCOUNTER — HOSPITAL ENCOUNTER (OUTPATIENT)
Dept: GENERAL RADIOLOGY | Facility: HOSPITAL | Age: 85
Discharge: HOME | End: 2025-02-07
Payer: MEDICARE

## 2025-02-07 ENCOUNTER — PRE-ADMISSION TESTING (OUTPATIENT)
Dept: PREADMISSION TESTING | Facility: HOSPITAL | Age: 85
End: 2025-02-07
Payer: MEDICARE

## 2025-02-07 VITALS
DIASTOLIC BLOOD PRESSURE: 78 MMHG | TEMPERATURE: 98.3 F | HEART RATE: 85 BPM | BODY MASS INDEX: 26.16 KG/M2 | HEIGHT: 65 IN | RESPIRATION RATE: 20 BRPM | SYSTOLIC BLOOD PRESSURE: 153 MMHG | OXYGEN SATURATION: 95 % | WEIGHT: 157 LBS

## 2025-02-07 LAB
ALBUMIN SERPL-MCNC: 4 G/DL (ref 3.5–5.2)
ALBUMIN/GLOB SERPL: 1.5 G/DL
ALP SERPL-CCNC: 64 U/L (ref 39–117)
ALT SERPL W P-5'-P-CCNC: 22 U/L (ref 1–33)
ANION GAP SERPL CALCULATED.3IONS-SCNC: 11.3 MMOL/L (ref 5–15)
AST SERPL-CCNC: 27 U/L (ref 1–32)
BILIRUB SERPL-MCNC: <0.2 MG/DL (ref 0–1.2)
BUN SERPL-MCNC: 22 MG/DL (ref 8–23)
BUN/CREAT SERPL: 26.8 (ref 7–25)
CALCIUM SPEC-SCNC: 9.8 MG/DL (ref 8.6–10.5)
CHLORIDE SERPL-SCNC: 104 MMOL/L (ref 98–107)
CO2 SERPL-SCNC: 23.7 MMOL/L (ref 22–29)
CREAT SERPL-MCNC: 0.82 MG/DL (ref 0.57–1)
DEPRECATED RDW RBC AUTO: 41.6 FL (ref 37–54)
EGFRCR SERPLBLD CKD-EPI 2021: 70.6 ML/MIN/1.73
ERYTHROCYTE [DISTWIDTH] IN BLOOD BY AUTOMATED COUNT: 12.3 % (ref 12.3–15.4)
GLOBULIN UR ELPH-MCNC: 2.6 GM/DL
GLUCOSE SERPL-MCNC: 139 MG/DL (ref 65–99)
HBA1C MFR BLD: 6.1 % (ref 4.8–5.6)
HCT VFR BLD AUTO: 35.4 % (ref 34–46.6)
HGB BLD-MCNC: 11.8 G/DL (ref 12–15.9)
INR PPP: 1.07 (ref 0.9–1.1)
MCH RBC QN AUTO: 31.4 PG (ref 26.6–33)
MCHC RBC AUTO-ENTMCNC: 33.3 G/DL (ref 31.5–35.7)
MCV RBC AUTO: 94.1 FL (ref 79–97)
PLATELET # BLD AUTO: 228 10*3/MM3 (ref 140–450)
PMV BLD AUTO: 9 FL (ref 6–12)
POTASSIUM SERPL-SCNC: 4.4 MMOL/L (ref 3.5–5.2)
PROT SERPL-MCNC: 6.6 G/DL (ref 6–8.5)
PROTHROMBIN TIME: 13.8 SECONDS (ref 11.7–14.2)
QT INTERVAL: 388 MS
QTC INTERVAL: 453 MS
RBC # BLD AUTO: 3.76 10*6/MM3 (ref 3.77–5.28)
SODIUM SERPL-SCNC: 139 MMOL/L (ref 136–145)
WBC NRBC COR # BLD AUTO: 6.91 10*3/MM3 (ref 3.4–10.8)

## 2025-02-07 PROCEDURE — 93010 ELECTROCARDIOGRAM REPORT: CPT | Performed by: INTERNAL MEDICINE

## 2025-02-07 PROCEDURE — 83036 HEMOGLOBIN GLYCOSYLATED A1C: CPT

## 2025-02-07 PROCEDURE — 85027 COMPLETE CBC AUTOMATED: CPT

## 2025-02-07 PROCEDURE — 93005 ELECTROCARDIOGRAM TRACING: CPT

## 2025-02-07 PROCEDURE — 36415 COLL VENOUS BLD VENIPUNCTURE: CPT

## 2025-02-07 PROCEDURE — 71046 X-RAY EXAM CHEST 2 VIEWS: CPT

## 2025-02-07 PROCEDURE — 85610 PROTHROMBIN TIME: CPT

## 2025-02-07 PROCEDURE — 80053 COMPREHEN METABOLIC PANEL: CPT

## 2025-02-07 PROCEDURE — 73560 X-RAY EXAM OF KNEE 1 OR 2: CPT

## 2025-02-07 RX ORDER — MULTIVIT-MIN/IRON/FOLIC ACID/K 18-600-40
1 CAPSULE ORAL DAILY
COMMUNITY

## 2025-02-07 RX ORDER — METOPROLOL SUCCINATE 50 MG/1
50 TABLET, EXTENDED RELEASE ORAL NIGHTLY
COMMUNITY
Start: 2025-01-10 | End: 2026-01-10

## 2025-02-07 RX ORDER — ASPIRIN 81 MG/1
81 TABLET ORAL NIGHTLY
COMMUNITY

## 2025-02-07 RX ORDER — EMPAGLIFLOZIN 10 MG/1
10 TABLET, FILM COATED ORAL DAILY
COMMUNITY
Start: 2024-08-23

## 2025-02-07 NOTE — DISCHARGE INSTRUCTIONS
Take the following medications the morning of surgery:    LEVOTHYROXINE    If you are on prescription narcotic pain medication to control your pain you may also take that medication the morning of surgery.      General Instructions:     Do not eat solid food after midnight the night before surgery.  Clear liquids day of surgery are allowed but must be stopped at least two hours before your hospital arrival time.       Allowed clear liquids      Water, sodas, and tea or coffee with no cream or milk added.       12 to 20 ounces of a clear liquid that contains carbohydrates is recommended.  If non-diabetic, have Gatorade or Powerade.  If diabetic, have G2 or Powerade Zero.     Do not have liquids red in color.  Do not consume chicken, beef, pork or vegetable broth or bouillon cubes of any variety as they are not considered clear liquids and are not allowed.      Infants may have breast milk up to four hours before surgery.  Infants drinking formula may drink formula up to six hours before surgery.   Patients who avoid smoking, chewing tobacco and alcohol for 4 weeks prior to surgery have a reduced risk of post-operative complications.  Quit smoking as many days before surgery as you can.  Do not smoke, use chewing tobacco or drink alcohol the day of surgery.   If applicable bring your C-PAP/ BI-PAP machine in with you to preop day of surgery.  Bring any papers given to you in the doctor’s office.  Wear clean comfortable clothes.  Do not wear contact lenses, false eyelashes or make-up.  Bring a case for your glasses.   Bring crutches or walker if applicable.  Remove all piercings.  Leave jewelry and any other valuables at home.  Hair extensions with metal clips must be removed prior to surgery.  The Pre-Admission Testing nurse will instruct you to bring medications if unable to obtain an accurate list in Pre-Admission Testing.        Preventing a Surgical Site Infection:  For 2 to 3 days before surgery, avoid shaving  with a razor because the razor can irritate skin and make it easier to develop an infection.    Any areas of open skin can increase the risk of a post-operative wound infection by allowing bacteria to enter and travel throughout the body.  Notify your surgeon if you have any skin wounds / rashes even if it is not near the expected surgical site.  The area will need assessed to determine if surgery should be delayed until it is healed.  The night prior to surgery shower using a fresh bar of anti-bacterial soap (such as Dial) and clean washcloth.  Sleep in a clean bed with clean clothing.  Do not allow pets to sleep with you.  Shower on the morning of surgery using a fresh bar of anti-bacterial soap (such as Dial) and clean washcloth.  Dry with a clean towel and dress in clean clothing.  Ask your surgeon if you will be receiving antibiotics prior to surgery.  Make sure you, your family, and all healthcare providers clean their hands with soap and water or an alcohol based hand  before caring for you or your wound.    Day of surgery:  Your arrival time is approximately two hours before your scheduled surgery time.  Please note if you have an early arrival time the surgery doors do not open before 5:00 AM.  Upon arrival, a Pre-op nurse and Anesthesiologist will review your health history, obtain vital signs, and answer questions you may have.  The only belongings needed at this time will be a list of your home medications and if applicable your C-PAP/BI-PAP machine.  A Pre-op nurse will start an IV and you may receive medication in preparation for surgery, including something to help you relax.     Please be aware that surgery does come with discomfort.  We want to make every effort to control your discomfort so please discuss any uncontrolled symptoms with your nurse.   Your doctor will most likely have prescribed pain medications.      If you are going home after surgery you will receive individualized  written care instructions before being discharged.  A responsible adult must drive you to and from the hospital on the day of your surgery and ideally stay with you through the night.   .  Discharge prescriptions can be filled by the hospital pharmacy during regular pharmacy hours.  If you are having surgery late in the day/evening your prescription may be e-prescribed to your pharmacy.  Please verify your pharmacy hours or chose a 24 hour pharmacy to avoid not having access to your prescription because your pharmacy has closed for the day.    If you are staying overnight following surgery, you will be transported to your hospital room following the recovery period.  [facility] has all private rooms.    If you have any questions please call Pre-Admission Testing at [phone].  Deductibles and co-payments are collected on the day of service. Please be prepared to pay the required co-pay, deductible or deposit on the day of service as defined by your plan.    Call your surgeon immediately if you experience any of the following symptoms:  Sore Throat  Shortness of Breath or difficulty breathing  Cough  Chills  Body soreness or muscle pain  Headache  Fever  New loss of taste or smell  Do not arrive for your surgery ill.  Your procedure will need to be rescheduled to another time.  You will need to call your physician before the day of surgery to avoid any unnecessary exposure to hospital staff as well as other patients.        CHLORHEXIDINE CLOTH INSTRUCTIONS  The morning of surgery follow these instructions using the Chlorhexidine cloths you've been given.  These steps reduce bacteria on the body.  Do not use the cloths near your eyes, ears mouth, genitalia or on open wounds.  Throw the cloths away after use but do not try to flush them down a toilet.      Open and remove one cloth at a time from the package.    Leave the cloth unfolded and begin the bathing.  Massage the skin with the cloths using  gentle pressure to remove bacteria.  Do not scrub harshly.   Follow the steps below with one 2% CHG cloth per area (6 total cloths).  One cloth for neck, shoulders and chest.  One cloth for both arms, hands, fingers and underarms (do underarms last).  One cloth for the abdomen followed by groin.  One cloth for right leg and foot including between the toes.  One cloth for left leg and foot including between the toes.  The last cloth is to be used for the back of the neck, back and buttocks.    Allow the CHG to air dry 3 minutes on the skin which will give it time to work and decrease the chance of irritation.  The skin may feel sticky until it is dry.  Do not rinse with water or any other liquid or you will lose the beneficial effects of the CHG.  If mild skin irritation occurs, do rinse the skin to remove the CHG.  Report this to the nurse at time of admission.  Do not apply lotions, creams, ointments, deodorants or perfumes after using the clothes. Dress in clean clothes before coming to the hospital.

## (undated) DEVICE — PATIENT RETURN ELECTRODE, SINGLE-USE, CONTACT QUALITY MONITORING, ADULT, WITH 9FT CORD, FOR PATIENTS WEIGING OVER 33LBS. (15KG): Brand: MEGADYNE

## (undated) DEVICE — GAUZE,SPONGE,FLUFF,6"X6.75",STRL,10/TRAY: Brand: MEDLINE

## (undated) DEVICE — BANDAGE,GAUZE,BULKEE II,4.5"X4.1YD,STRL: Brand: MEDLINE

## (undated) DEVICE — KT DRN EVAC WND PVC PCH WTROC RND 10F400

## (undated) DEVICE — NDL SPINE 20G 3 1/2 YEL STRL 1P/U

## (undated) DEVICE — PK ENT 40

## (undated) DEVICE — GOWN,NON-REINFORCED,SIRUS,SET IN SLV,XL: Brand: MEDLINE

## (undated) DEVICE — ANTIBACTERIAL UNDYED BRAIDED (POLYGLACTIN 910), SYNTHETIC ABSORBABLE SUTURE: Brand: COATED VICRYL

## (undated) DEVICE — GLV SURG BIOGEL M LTX PF 6 1/2

## (undated) DEVICE — SOL NACL 0.9PCT 1000ML

## (undated) DEVICE — 2108 SERIES SAGITTAL BLADE, NO OFFSET  (12.4 X 1.19 X 82.1MM)

## (undated) DEVICE — DUAL CUT SAGITTAL BLADE

## (undated) DEVICE — TBG PENCL TELESCP MEGADYNE SMOKE EVAC 10FT

## (undated) DEVICE — CROUCH CORNEAL PROTECTOR: Brand: BAUSCH + LOMB

## (undated) DEVICE — RIMMED SPEED PIN 45MM STERILE

## (undated) DEVICE — NDL HYPO PRECISIONGLIDE REG 25G 1 1/2

## (undated) DEVICE — SYR LUERLOK 20CC

## (undated) DEVICE — MARKR SKIN W/RULR AND LBL

## (undated) DEVICE — APPL DURAPREP IODOPHOR APL 26ML

## (undated) DEVICE — BNDG ELAS ELITE V/CLOSE 6IN 5YD LF STRL

## (undated) DEVICE — STERILE COTTON TIP 6IN 10PK: Brand: MEDLINE

## (undated) DEVICE — DRSNG WND GZ CURAD OIL EMULSION 3X8IN LF STRL 1PK

## (undated) DEVICE — ENCORE® LATEX ORTHO SIZE 8.5, STERILE LATEX POWDER-FREE SURGICAL GLOVE: Brand: ENCORE

## (undated) DEVICE — ZIP 24 SURGICAL SKIN CLOSURE DEVICE, PSA: Brand: ZIP 24 SURGICAL SKIN CLOSURE DEVICE

## (undated) DEVICE — UNDERCAST PADDING: Brand: DEROYAL

## (undated) DEVICE — WIPE INST MEROCEL

## (undated) DEVICE — TRAP FLD MINIVAC MEGADYNE 100ML

## (undated) DEVICE — GLV SURG SENSICARE W/ALOE PF LF 9 STRL

## (undated) DEVICE — ELECTRD NDL EZ CLN MOD 2.75IN

## (undated) DEVICE — NDL HYPO PRECISIONGLIDE/REG 30G 1/2 BRN

## (undated) DEVICE — SWABSTK SKINPREP PVPI LF PK/3

## (undated) DEVICE — ENCORE® LATEX ORTHO SIZE 9, STERILE LATEX POWDER-FREE SURGICAL GLOVE: Brand: ENCORE

## (undated) DEVICE — PK KN TOTL 40

## (undated) DEVICE — STPLR SKIN VISISTAT WD 35CT

## (undated) DEVICE — GLV SURG TRIUMPH CLASSIC PF LTX 8.5 STRL

## (undated) DEVICE — GLV SURG BIOGEL SENSR LTX PF SZ7.5

## (undated) DEVICE — SUT GUT PLN FAST ABS 5/0 PC1 18IN 1915G